# Patient Record
Sex: MALE | Race: WHITE | NOT HISPANIC OR LATINO | Employment: FULL TIME | URBAN - NONMETROPOLITAN AREA
[De-identification: names, ages, dates, MRNs, and addresses within clinical notes are randomized per-mention and may not be internally consistent; named-entity substitution may affect disease eponyms.]

---

## 2018-06-20 ENCOUNTER — OFFICE VISIT (OUTPATIENT)
Dept: CHIROPRACTIC MEDICINE | Facility: OTHER | Age: 38
End: 2018-06-20
Attending: CHIROPRACTOR
Payer: COMMERCIAL

## 2018-06-20 DIAGNOSIS — M99.02 SEGMENTAL AND SOMATIC DYSFUNCTION OF THORACIC REGION: ICD-10-CM

## 2018-06-20 DIAGNOSIS — M99.03 SEGMENTAL AND SOMATIC DYSFUNCTION OF LUMBAR REGION: Primary | ICD-10-CM

## 2018-06-20 DIAGNOSIS — M99.01 SEGMENTAL AND SOMATIC DYSFUNCTION OF CERVICAL REGION: ICD-10-CM

## 2018-06-20 DIAGNOSIS — M54.50 ACUTE BILATERAL LOW BACK PAIN WITHOUT SCIATICA: ICD-10-CM

## 2018-06-20 PROCEDURE — 98941 CHIROPRACT MANJ 3-4 REGIONS: CPT | Mod: AT | Performed by: CHIROPRACTOR

## 2018-06-20 PROCEDURE — 99212 OFFICE O/P EST SF 10 MIN: CPT | Mod: 25 | Performed by: CHIROPRACTOR

## 2018-06-20 NOTE — MR AVS SNAPSHOT
After Visit Summary   6/20/2018    Colton Booth    MRN: [id number]           Patient Information     Date Of Birth          1980        Visit Information        Provider Department      6/20/2018 4:20 PM Jeffery Martinez DC  [facility]        Today's Diagnoses     Segmental and somatic dysfunction of lumbar region    -  1    Acute bilateral low back pain without sciatica        Segmental and somatic dysfunction of thoracic region        Segmental and somatic dysfunction of cervical region           Follow-ups after your visit        Who to contact     If you have questions or need follow up information about today's clinic visit or your schedule please contact  [facility] directly at [phone].  Normal or non-critical lab and imaging results will be communicated to you by MyChart, letter or phone within 4 business days after the clinic has received the results. If you do not hear from us within 7 days, please contact the clinic through MyChart or phone. If you have a critical or abnormal lab result, we will notify you by phone as soon as possible.  Submit refill requests through [employer] or call your pharmacy and they will forward the refill request to us. Please allow 3 business days for your refill to be completed.          Additional Information About Your Visit        Care EveryWhere ID     This is your Care EveryWhere ID. This could be used by other organizations to access your [facility] medical records  [id number]         Blood Pressure from Last 3 Encounters:   02/26/16 136/77   07/28/15 127/92    Weight from Last 3 Encounters:   02/26/16 190 lb (86.2 kg)              We Performed the Following     CHIROPRAC MANIP,SPINAL,3-4 REGIONS        Primary Care Provider    None Specified       No primary provider on file.        Equal Access to Services     [facility] SEBASTIEN : Courtney Moses, do manning, mae welch  shirley humphreys ah. So [facility] [phone].    ATENCIÓN: Si habla cheyenne, tiene a rehman disposición servicios gratuitos de asistencia lingüística. Rohit al [phone].    We comply with applicable federal civil rights laws and [place] laws. We do not discriminate on the basis of race, color, national origin, age, disability, sex, sexual orientation, or gender identity.            Thank you!     Thank you for choosing  [facility]  for your care. Our goal is always to provide you with excellent care. Hearing back from our patients is one way we can continue to improve our services. Please take a few minutes to complete the written survey that you may receive in the mail after your visit with us. Thank you!             Your Updated Medication List - Protect others around you: Learn how to safely use, store and throw away your medicines at www.disposemymeds.org.          This list is accurate as of 6/20/18 11:59 PM.  Always use your most recent med list.                   Brand Name Dispense Instructions for use Diagnosis    oseltamivir 75 MG capsule    TAMIFLU    10 capsule    Take 1 capsule (75 mg) by mouth 2 times daily    Influenza A

## 2018-06-21 NOTE — PROGRESS NOTES
Subjective Finding:    Chief compalint: Patient presents with:  Neck Pain  Back Pain: sharp low back pain  , Pain Scale: 5/10, Intensity: dull, Duration: 2 weeks, Radiating: no.    Date of injury:     Activities that the pain restricts:   Home/household/hobbies/social activities: no.  Work duties: no.  Sleep: no.  Makes symptoms better: rest.  Makes symptoms worse: activity.  Have you seen anyone else for the symptoms? No.  Work related: no.  Automobile related injury: no.    Objective and Assessment:    Posture Analysis:   High shoulder: .  Head tilt: .  High iliac crest: .  Head carriage: neutral.  Thoracic Kyphosis: neutral.  Lumbar Lordosis: forward.    Lumbar Range of Motion: flexion decreased and extension decreased.  Cervical Range of Motion: .  Thoracic Range of Motion: extension decreased.  Extremity Range of Motion: .    Palpation:   Quad lumb: bilateral, referred pain: no    Segmental dysfunction pre-treatment and treatment area: T3, T5, L4 and L5.  C3    Assessment post-treatment:  Cervical: .  Thoracic: ROM increased.  Lumbar: ROM increased.    Comments: .      Complicating Factors: .    Plan / Procedure:    Treatment plan: PRN.  Instructed patient: ice 20 minutes every other hour as needed.  Short term goals: reduce pain and increase ROM.  Long term goals: restore normal function.  Prognosis: excellent.

## 2018-06-22 ENCOUNTER — OFFICE VISIT (OUTPATIENT)
Dept: CHIROPRACTIC MEDICINE | Facility: OTHER | Age: 38
End: 2018-06-22
Attending: CHIROPRACTOR
Payer: COMMERCIAL

## 2018-06-22 DIAGNOSIS — M99.02 SEGMENTAL AND SOMATIC DYSFUNCTION OF THORACIC REGION: ICD-10-CM

## 2018-06-22 DIAGNOSIS — M99.03 SEGMENTAL AND SOMATIC DYSFUNCTION OF LUMBAR REGION: Primary | ICD-10-CM

## 2018-06-22 DIAGNOSIS — M99.01 SEGMENTAL AND SOMATIC DYSFUNCTION OF CERVICAL REGION: ICD-10-CM

## 2018-06-22 DIAGNOSIS — M54.50 ACUTE BILATERAL LOW BACK PAIN WITHOUT SCIATICA: ICD-10-CM

## 2018-06-22 PROCEDURE — 98941 CHIROPRACT MANJ 3-4 REGIONS: CPT | Mod: AT | Performed by: CHIROPRACTOR

## 2018-06-22 NOTE — MR AVS SNAPSHOT
After Visit Summary   6/22/2018    Colton Booth    MRN: [id number]           Patient Information     Date Of Birth          1980        Visit Information        Provider Department      6/22/2018 12:50 PM Jeffery Martinez DC  [facility]        Today's Diagnoses     Segmental and somatic dysfunction of lumbar region    -  1    Acute bilateral low back pain without sciatica        Segmental and somatic dysfunction of thoracic region        Segmental and somatic dysfunction of cervical region           Follow-ups after your visit        Who to contact     If you have questions or need follow up information about today's clinic visit or your schedule please contact  [facility] directly at [phone].  Normal or non-critical lab and imaging results will be communicated to you by MyChart, letter or phone within 4 business days after the clinic has received the results. If you do not hear from us within 7 days, please contact the clinic through MyChart or phone. If you have a critical or abnormal lab result, we will notify you by phone as soon as possible.  Submit refill requests through [employer] or call your pharmacy and they will forward the refill request to us. Please allow 3 business days for your refill to be completed.          Additional Information About Your Visit        Care EveryWhere ID     This is your Care EveryWhere ID. This could be used by other organizations to access your [facility] medical records  [id number]         Blood Pressure from Last 3 Encounters:   02/26/16 136/77   07/28/15 127/92    Weight from Last 3 Encounters:   02/26/16 190 lb (86.2 kg)              We Performed the Following     CHIROPRAC MANIP,SPINAL,3-4 REGIONS        Primary Care Provider Fax #    Physician No Ref-Primary [phone]       No address on file        Equal Access to Services     JENNY CROWE : Courtney Moses, do manning, bijan casitllo,  mae nunezangel moura'aan ah. So [facility] [phone].    ATENCIÓN: Si habla cheyenne, tiene a rehman disposición servicios gratuitos de asistencia lingüística. Rohit al [phone].    We comply with applicable federal civil rights laws and [place] laws. We do not discriminate on the basis of race, color, national origin, age, disability, sex, sexual orientation, or gender identity.            Thank you!     Thank you for choosing  [facility]  for your care. Our goal is always to provide you with excellent care. Hearing back from our patients is one way we can continue to improve our services. Please take a few minutes to complete the written survey that you may receive in the mail after your visit with us. Thank you!             Your Updated Medication List - Protect others around you: Learn how to safely use, store and throw away your medicines at www.disposemymeds.org.          This list is accurate as of 6/22/18 11:59 PM.  Always use your most recent med list.                   Brand Name Dispense Instructions for use Diagnosis    oseltamivir 75 MG capsule    TAMIFLU    10 capsule    Take 1 capsule (75 mg) by mouth 2 times daily    Influenza A

## 2018-06-25 NOTE — PROGRESS NOTES
Subjective Finding:    Chief compalint: Patient presents with:  Neck Pain  Back Pain  , Pain Scale: 5/10, Intensity: dull, Duration: 2 weeks, Radiating: no.    Date of injury:     Activities that the pain restricts:   Home/household/hobbies/social activities: no.  Work duties: no.  Sleep: no.  Makes symptoms better: rest.  Makes symptoms worse: activity.  Have you seen anyone else for the symptoms? No.  Work related: no.  Automobile related injury: no.    Objective and Assessment:    Posture Analysis:   High shoulder: .  Head tilt: .  High iliac crest: .  Head carriage: neutral.  Thoracic Kyphosis: neutral.  Lumbar Lordosis: forward.    Lumbar Range of Motion: flexion decreased and extension decreased.  Cervical Range of Motion: .  Thoracic Range of Motion: extension decreased.  Extremity Range of Motion: .    Palpation:   Quad lumb: bilateral, referred pain: no    Segmental dysfunction pre-treatment and treatment area: T3, T5, L4 and L5.  C3    Assessment post-treatment:  Cervical: .  Thoracic: ROM increased.  Lumbar: ROM increased.    Comments: .      Complicating Factors: .    Plan / Procedure:    Treatment plan: PRN.  Instructed patient: ice 20 minutes every other hour as needed.  Short term goals: reduce pain and increase ROM.  Long term goals: restore normal function.  Prognosis: excellent.

## 2018-09-05 ENCOUNTER — HOSPITAL ENCOUNTER (EMERGENCY)
Facility: HOSPITAL | Age: 38
Discharge: HOME | End: 2018-09-05
Attending: FAMILY MEDICINE | Admitting: FAMILY MEDICINE
Payer: COMMERCIAL

## 2018-09-05 VITALS
SYSTOLIC BLOOD PRESSURE: 149 MMHG | TEMPERATURE: 97.3 F | DIASTOLIC BLOOD PRESSURE: 94 MMHG | RESPIRATION RATE: 16 BRPM | OXYGEN SATURATION: 98 %

## 2018-09-05 DIAGNOSIS — T78.40XA ALLERGIC REACTION, INITIAL ENCOUNTER: ICD-10-CM

## 2018-09-05 DIAGNOSIS — T63.441A BEE STING REACTION, ACCIDENTAL OR UNINTENTIONAL, INITIAL ENCOUNTER: ICD-10-CM

## 2018-09-05 PROCEDURE — 25000132 ZZH RX MED GY IP 250 OP 250 PS 637: Performed by: FAMILY MEDICINE

## 2018-09-05 PROCEDURE — 99283 EMERGENCY DEPT VISIT LOW MDM: CPT | Mod: Z6 | Performed by: FAMILY MEDICINE

## 2018-09-05 PROCEDURE — 99283 EMERGENCY DEPT VISIT LOW MDM: CPT

## 2018-09-05 RX ORDER — LORAZEPAM 1 MG/1
1 TABLET ORAL ONCE
Status: DISCONTINUED | OUTPATIENT
Start: 2018-09-05 | End: 2018-09-05

## 2018-09-05 RX ORDER — DIPHENHYDRAMINE HCL 25 MG
50 CAPSULE ORAL ONCE
Status: COMPLETED | OUTPATIENT
Start: 2018-09-05 | End: 2018-09-05

## 2018-09-05 RX ORDER — EPINEPHRINE 0.3 MG/.3ML
0.3 INJECTION SUBCUTANEOUS
Qty: 0.6 ML | Refills: 1 | Status: SHIPPED | OUTPATIENT
Start: 2018-09-05 | End: 2023-04-13

## 2018-09-05 RX ADMIN — RANITIDINE 150 MG: 150 TABLET ORAL at 21:07

## 2018-09-05 RX ADMIN — DIPHENHYDRAMINE HYDROCHLORIDE 50 MG: 25 CAPSULE ORAL at 21:07

## 2018-09-05 ASSESSMENT — ENCOUNTER SYMPTOMS
TREMORS: 0
PSYCHIATRIC NEGATIVE: 1
SORE THROAT: 0
PALPITATIONS: 0
HEMATOLOGIC/LYMPHATIC NEGATIVE: 1
GASTROINTESTINAL NEGATIVE: 1
WEAKNESS: 0
ENDOCRINE NEGATIVE: 1
HEADACHES: 0
CONSTITUTIONAL NEGATIVE: 1
SEIZURES: 0
LIGHT-HEADEDNESS: 1
SHORTNESS OF BREATH: 0
DIZZINESS: 1
WHEEZING: 0
TROUBLE SWALLOWING: 0
NUMBNESS: 0
STRIDOR: 0

## 2018-09-05 NOTE — ED AVS SNAPSHOT
[facility]    [address]    [place] [address]    Phone:  [phone]                                       Colton Booth   MRN: [id number]    Department:  [facility]   Date of Visit:  9/5/2018           Patient Information     Date Of Birth          1980        Your diagnoses for this visit were:     Allergic reaction, initial encounter     Bee sting reaction, accidental or unintentional, initial encounter        You were seen by Felicia Dawn MD.      Follow-up Information     Follow up with No Ref-Primary, Physician.    Why:  As needed        Discharge Instructions             Insect, Spider, and Scorpion Bites and Stings  Most insect bites are harmless and cause only minor swelling or itching. But if you re allergic to insects such as wasps or bees, a sting can cause a life-threatening allergic reaction. Some ticks can carry and transmit serious diseases. The venom (poison) from scorpions and certain spiders can also be deadly, although this is rare. Knowing when to seek emergency care could save your life.     The black  (top) and brown recluse (bottom) are two poisonous spiders found in the [place].   When to go to the emergency room (ER)    Scorpion sting    Bite from a black, red, or brown  spider or brown recluse spider    Severe pain or swelling at the site of bite    A tick that is embedded in your skin and can not be easily removed at home    Signs of an allergic reaction such as:  ? Hives  ? Swelling of your eyes, lips, or the inside of your throat  ? Trouble breathing  ? Dizziness or confusion  What to expect in the ER    If you re having trouble breathing, you ll be given oxygen through a mask. In case of severe breathing difficulty, you may have a tube inserted in your throat and be placed on a ventilator (breathing machine).    If you are having a severe allergic reaction from a sting (called anaphylaxis), you may be  "given a shot of epinephrine. If it is known that you are allergic to bee or wasp stings, your doctor may give you a prescription for an \"epi-pen\" that you can keep with you at all times in case of a sting.    You may receive antivenin (a substance that reverses the effects of poison) for some spider bites and scorpion stings. Because antivenin can sometimes cause other problems, your doctor will weigh the risks and benefits of this treatment.    Steroids such as prednisone are often used to treat allergic reactions. In many cases, your doctor will also prescribe an antihistamine to help relieve itching.  Easing symptoms of an insect bite or sting    Try to remove a stinger you can see. Use your fingernail, a knife edge, or credit card to scrape against the skin. Do not squeeze or pull.    Apply ice or a cold compress to reduce pain and swelling (keep a thin cloth between the cold source and the skin).   Date Last Reviewed: 12/1/2016 2000-2017 [employer]. [address] [place] [address]. All rights reserved. This information is not intended as a substitute for professional medical care. Always follow your healthcare professional's instructions.        0294-6594 [employer]. [address] [place] [address]. All rights reserved. This information is not intended as a substitute for professional medical care. Always follow your healthcare professional's instructions.             Review of your medicines      START taking        Dose / Directions Last dose taken    EPINEPHrine 0.3 MG/0.3ML injection 2-pack   Commonly known as:  EPIPEN/ADRENACLICK/or ANY BX GENERIC EQUIV   Dose:  0.3 mg   Quantity:  0.6 mL        Inject 0.3 mLs (0.3 mg) into the muscle once as needed for anaphylaxis   Refills:  1                Prescriptions were sent or printed at these locations (1 Prescription)                   [facility] [place] - [address]   [address], [place] [address]    Telephone:  [phone]   Fax:  [phone]   Hours:                  E-Prescribed (1 of 1)         EPINEPHrine (EPIPEN/ADRENACLICK/OR ANY BX GENERIC EQUIV) 0.3 MG/0.3ML injection 2-pack                Orders Needing Specimen Collection     None      Pending Results     No orders found from 9/3/2018 to 9/6/2018.            Pending Culture Results     No orders found from 9/3/2018 to 9/6/2018.            Thank you for choosing [facility]       Thank you for choosing [facility] for your care. Our goal is always to provide you with excellent care. Hearing back from our patients is one way we can continue to improve our services. Please take a few minutes to complete the written survey that you may receive in the mail after you visit with us. Thank you!        Care EveryWhere ID     This is your Care EveryWhere ID. This could be used by other organizations to access your [facility] medical records  [id number]        Equal Access to Services     JENNY CROWE : Courtney Moses, do manning, bijan castillo, mae humphreys . So [facility] [phone].    ATENCIÓN: Si habla español, tiene a rehman disposición servicios gratuitos de asistencia lingüística. Llame al [phone].    We comply with applicable federal civil rights laws and [place] laws. We do not discriminate on the basis of race, color, national origin, age, disability, sex, sexual orientation, or gender identity.            After Visit Summary       This is your record. Keep this with you and show to your community pharmacist(s) and doctor(s) at your next visit.

## 2018-09-05 NOTE — ED AVS SNAPSHOT
[facility]    [address] [place] [address]    Phone:  [phone]                                       Colton Booth   MRN: [id number]    Department:  [facility]   Date of Visit:  9/5/2018           After Visit Summary Signature Page     I have received my discharge instructions, and my questions have been answered. I have discussed any challenges I see with this plan with the nurse or doctor.    ..........................................................................................................................................  Patient/Patient Representative Signature      ..........................................................................................................................................  Patient Representative Print Name and Relationship to Patient    ..................................................               ................................................  Date                                            Time    ..........................................................................................................................................  Reviewed by Signature/Title    ...................................................              ..............................................  Date                                                            Time          22EPIC Rev 08/18

## 2018-09-06 NOTE — ED PROVIDER NOTES
"  History     Chief Complaint   Patient presents with     Insect Bite     c/o bee sting approx 2 hrs, notes throat \"feels weird\", but not swollen. no resp distress. c/o dizziness. took benadryl at approx 1800     HPI  Colton Booth is a 38 year old male who presents after being stung by a bee 3 hours ago .   Took benadryl unsure how much 10-15 minutes following the sting . Stung right ear. Swelling seem s to have improved somewhat . Was getting dizzy and nauseated shortly after the sting. Dizziness prompted him to come to the ER .  Swallowing felt weird initially but that has improved now .   NO other previous severe allergic reactions . NO significant wheezing in chest . Denies any rash. No sob. NO history of asthma or lung problems. He does not take any medications on a regular basis     Problem List:    There are no active problems to display for this patient.       Past Medical History:    History reviewed. No pertinent past medical history.    Past Surgical History:    Past Surgical History:   Procedure Laterality Date     CIRCUMCISION       EYE SURGERY      lazy eye surgery, bilateral     FINGER SURGERY      cyst ring finger, LT       Family History:    Family History   Problem Relation Age of Onset     Diabetes Father      Diabetes Paternal Grandfather      High cholesterol Father        Social History:  Marital Status:   [2]  Social History   Substance Use Topics     Smoking status: Former Smoker     Packs/day: 0.30     Years: 10.00     Types: Cigarettes     Smokeless tobacco: Not on file      Comment: tried to quit yes, yr quit 2009, no passive exposure     Alcohol use Yes      Comment: every other weekend        Medications:      No current outpatient prescriptions on file.      Review of Systems   Constitutional: Negative.    HENT: Negative.  Negative for sore throat and trouble swallowing.         Throat just \" felt funny\"    Respiratory: Negative for shortness of breath, wheezing and stridor. "    Cardiovascular: Negative for chest pain and palpitations.   Gastrointestinal: Negative.    Endocrine: Negative.    Genitourinary: Negative.    Skin: Negative.    Neurological: Positive for dizziness and light-headedness. Negative for tremors, seizures, syncope, weakness, numbness and headaches.   Hematological: Negative.    Psychiatric/Behavioral: Negative.        Physical Exam   BP: 149/94  Heart Rate: 79  Temp: 97.3  F (36.3  C)  Resp: 16  SpO2: 98 %      Physical Exam   Constitutional: He is oriented to person, place, and time. He appears well-developed and well-nourished. No distress.   HENT:   Head: Normocephalic and atraumatic.   Right Ear: External ear normal.   Left Ear: External ear normal.   Nose: Nose normal.   Mouth/Throat: Oropharynx is clear and moist. No oropharyngeal exudate.   Eyes: Conjunctivae and EOM are normal. Pupils are equal, round, and reactive to light. Right eye exhibits no discharge. Left eye exhibits no discharge.   Neck: Normal range of motion. Neck supple. No JVD present. No tracheal deviation present. No thyromegaly present.   Cardiovascular: Normal rate, regular rhythm, normal heart sounds and intact distal pulses.  Exam reveals no gallop and no friction rub.    No murmur heard.  Pulmonary/Chest: Breath sounds normal. No stridor. He is in respiratory distress. He has no wheezes. He has no rales. He exhibits no tenderness.   Abdominal: Soft. Bowel sounds are normal. He exhibits no distension and no mass. There is no tenderness. There is no rebound and no guarding.   Musculoskeletal: Normal range of motion. He exhibits no edema, tenderness or deformity.   Lymphadenopathy:     He has no cervical adenopathy.   Neurological: He is alert and oriented to person, place, and time.   Skin: Skin is warm and dry. He is not diaphoretic.   Psychiatric: He has a normal mood and affect.   Nursing note and vitals reviewed.      ED Course     ED Course     Procedures          Patient presents to ER  with concerns of reaction to Bee sting that occurred 3 hours previously . Patient triaged to exam room . Vital signs reviewed. History and exam completed. Patient without signs or symptoms to suggest anaphylaxis. Patient did apply ice and did take benadryl of uncertain dose several hours previously at time of sting. Patient stated felt better at time of initial exam and wanted discharge as soon as possible. Patient given repeat dose of benadaryl and zantac. Discussed with patient that reactions often become more severe the more often they occur. He may continue to use benadadryl , zantac as needed next 24 - 48 hours as needed for itch ,localized swelling, discomfort. If he should develop symptoms of more severe allergic reaction he should return to ER for reevaluation . I have recommended he  an epi pen and carry that with him as if he gets stung again his next reaction is likely to be more severe  .        No results found for this or any previous visit (from the past 24 hour(s)).    Medications   0.9% sodium chloride BOLUS (not administered)       Assessments & Plan (with Medical Decision Making)     I have reviewed the nursing notes.    I have reviewed the findings, diagnosis, plan and need for follow up with the patient.      New Prescriptions    No medications on file       Final diagnoses:   Allergic reaction, initial encounter   Bee sting reaction, accidental or unintentional, initial encounter       9/5/2018   [facility]     Felicia Dawn MD  09/05/18 2708

## 2018-09-06 NOTE — DISCHARGE INSTRUCTIONS
"      Insect, Spider, and Scorpion Bites and Stings  Most insect bites are harmless and cause only minor swelling or itching. But if you re allergic to insects such as wasps or bees, a sting can cause a life-threatening allergic reaction. Some ticks can carry and transmit serious diseases. The venom (poison) from scorpions and certain spiders can also be deadly, although this is rare. Knowing when to seek emergency care could save your life.     The black  (top) and brown recluse (bottom) are two poisonous spiders found in the [place].   When to go to the emergency room (ER)    Scorpion sting    Bite from a black, red, or brown  spider or brown recluse spider    Severe pain or swelling at the site of bite    A tick that is embedded in your skin and can not be easily removed at home    Signs of an allergic reaction such as:  ? Hives  ? Swelling of your eyes, lips, or the inside of your throat  ? Trouble breathing  ? Dizziness or confusion  What to expect in the ER    If you re having trouble breathing, you ll be given oxygen through a mask. In case of severe breathing difficulty, you may have a tube inserted in your throat and be placed on a ventilator (breathing machine).    If you are having a severe allergic reaction from a sting (called anaphylaxis), you may be given a shot of epinephrine. If it is known that you are allergic to bee or wasp stings, your doctor may give you a prescription for an \"epi-pen\" that you can keep with you at all times in case of a sting.    You may receive antivenin (a substance that reverses the effects of poison) for some spider bites and scorpion stings. Because antivenin can sometimes cause other problems, your doctor will weigh the risks and benefits of this treatment.    Steroids such as prednisone are often used to treat allergic reactions. In many cases, your doctor will also prescribe an antihistamine to help relieve itching.  Easing symptoms of an insect bite or " sting    Try to remove a stinger you can see. Use your fingernail, a knife edge, or credit card to scrape against the skin. Do not squeeze or pull.    Apply ice or a cold compress to reduce pain and swelling (keep a thin cloth between the cold source and the skin).   Date Last Reviewed: 12/1/2016 2000-2017 [employer]. [address] [place] [address]. All rights reserved. This information is not intended as a substitute for professional medical care. Always follow your healthcare professional's instructions.        2731-2825 [employer]. [address] [place] [address]. All rights reserved. This information is not intended as a substitute for professional medical care. Always follow your healthcare professional's instructions.

## 2018-09-07 ENCOUNTER — OFFICE VISIT (OUTPATIENT)
Dept: FAMILY MEDICINE | Facility: OTHER | Age: 38
End: 2018-09-07
Attending: NURSE PRACTITIONER
Payer: COMMERCIAL

## 2018-09-07 VITALS
HEART RATE: 70 BPM | BODY MASS INDEX: 26.11 KG/M2 | TEMPERATURE: 96.7 F | OXYGEN SATURATION: 97 % | HEIGHT: 73 IN | SYSTOLIC BLOOD PRESSURE: 108 MMHG | DIASTOLIC BLOOD PRESSURE: 70 MMHG | WEIGHT: 197 LBS | RESPIRATION RATE: 16 BRPM

## 2018-09-07 DIAGNOSIS — T78.40XD ALLERGIC REACTION, SUBSEQUENT ENCOUNTER: ICD-10-CM

## 2018-09-07 DIAGNOSIS — H60.11 CELLULITIS OF RIGHT EAR: Primary | ICD-10-CM

## 2018-09-07 PROCEDURE — 99213 OFFICE O/P EST LOW 20 MIN: CPT | Performed by: NURSE PRACTITIONER

## 2018-09-07 RX ORDER — CEPHALEXIN 500 MG/1
500 CAPSULE ORAL 2 TIMES DAILY
Qty: 20 CAPSULE | Refills: 0 | Status: SHIPPED | OUTPATIENT
Start: 2018-09-07 | End: 2018-09-17

## 2018-09-07 RX ORDER — PREDNISONE 20 MG/1
20 TABLET ORAL DAILY
Qty: 5 TABLET | Refills: 0 | Status: SHIPPED | OUTPATIENT
Start: 2018-09-07 | End: 2023-04-12

## 2018-09-07 ASSESSMENT — ANXIETY QUESTIONNAIRES
4. TROUBLE RELAXING: NOT AT ALL
5. BEING SO RESTLESS THAT IT IS HARD TO SIT STILL: NOT AT ALL
2. NOT BEING ABLE TO STOP OR CONTROL WORRYING: NOT AT ALL
3. WORRYING TOO MUCH ABOUT DIFFERENT THINGS: NOT AT ALL
GAD7 TOTAL SCORE: 0
7. FEELING AFRAID AS IF SOMETHING AWFUL MIGHT HAPPEN: NOT AT ALL
6. BECOMING EASILY ANNOYED OR IRRITABLE: NOT AT ALL
1. FEELING NERVOUS, ANXIOUS, OR ON EDGE: NOT AT ALL
IF YOU CHECKED OFF ANY PROBLEMS ON THIS QUESTIONNAIRE, HOW DIFFICULT HAVE THESE PROBLEMS MADE IT FOR YOU TO DO YOUR WORK, TAKE CARE OF THINGS AT HOME, OR GET ALONG WITH OTHER PEOPLE: NOT DIFFICULT AT ALL

## 2018-09-07 NOTE — MR AVS SNAPSHOT
After Visit Summary   9/7/2018    Colotn Booth    MRN: [id number]           Patient Information     Date Of Birth          1980        Visit Information        Provider Department      9/7/2018 3:30 PM Sharyn Granger NP [facility]        Today's Diagnoses     Cellulitis of right ear    -  1    Allergic reaction, subsequent encounter          Care Instructions      ASSESSMENT/PLAN:     1. Cellulitis of right ear  symptomatic  - cephALEXin (KEFLEX) 500 MG capsule; Take 1 capsule (500 mg) by mouth 2 times daily for 10 days  Dispense: 20 capsule; Refill: 0    2. Allergic reaction, subsequent encounter  symptomatic  - predniSONE (DELTASONE) 20 MG tablet; Take 1 tablet (20 mg) by mouth daily  Dispense: 5 tablet; Refill: 0    FUTURE APPOINTMENTS:       - Follow-up visit as needed; to ED with any worsening     Sharyn Granger NP  [facility]  Cellulitis  Cellulitis is an infection of the deep layers of skin. A break in the skin, such as a cut or scratch, can let bacteria under the skin. If the bacteria get to deep layers of the skin, it can be serious. If not treated, cellulitis can get into the bloodstream and lymph nodes. The infection can then spread throughout the body. This causes serious illness.  Cellulitis causes the affected skin to become red, swollen, warm, and sore. The reddened areas have a visible border. An open sore may leak fluid (pus). You may have a fever, chills, and pain.  Cellulitis is treated with antibiotics taken for 7 to 10 days. An open sore may be cleaned and covered with cool wet gauze. Symptoms should get better 1 to 2 days after treatment is started. Make sure to take all the antibiotics for the full number of days until they are gone. Keep taking the medicine even if your symptoms go away.  Home care  Follow these tips:    Limit the use of the part of your body with cellulitis.     If the infection is on your leg, keep your  leg raised while sitting. This will help to reduce swelling.    Take all of the antibiotic medicine exactly as directed until it is gone. Do not miss any doses, especially during the first 7 days. Don t stop taking the medicine when your symptoms get better.    Keep the affected area clean and dry.    Wash your hands with soap and warm water before and after touching your skin. Anyone else who touches your skin should also wash his or her hands. Don't share towels.  Follow-up care  Follow up with your healthcare provider, or as advised. If your infection does not go away on the first antibiotic, your healthcare provider will prescribe a different one.  When to seek medical advice  Call your healthcare provider right away if any of these occur:    Red areas that spread    Swelling or pain that gets worse    Fluid leaking from the skin (pus)    Fever higher of 100.4  F (38.0  C) or higher after 2 days on antibiotics  Date Last Reviewed: 9/1/2016 2000-2017 [employer]. [address] [place] [address]. All rights reserved. This information is not intended as a substitute for professional medical care. Always follow your healthcare professional's instructions.                Follow-ups after your visit        Follow-up notes from your care team     Return if symptoms worsen or fail to improve.      Who to contact     If you have questions or need follow up information about today's clinic visit or your schedule please contact [facility] directly at [phone].  Normal or non-critical lab and imaging results will be communicated to you by MyChart, letter or phone within 4 business days after the clinic has received the results. If you do not hear from us within 7 days, please contact the clinic through MyChart or phone. If you have a critical or abnormal lab result, we will notify you by phone as soon as possible.  Submit refill requests through [facility] or call your pharmacy and  "they will forward the refill request to us. Please allow 3 business days for your refill to be completed.          Additional Information About Your Visit        Care EveryWhere ID     This is your Care EveryWhere ID. This could be used by other organizations to access your [place] medical records  [id number]        Your Vitals Were     Pulse Temperature Respirations Height Pulse Oximetry BMI (Body Mass Index)    70 96.7  F (35.9  C) (Tympanic) 16 6' 1\" (1.854 m) 97% 25.99 kg/m2       Blood Pressure from Last 3 Encounters:   09/07/18 108/70   09/05/18 149/94   02/26/16 136/77    Weight from Last 3 Encounters:   09/07/18 197 lb (89.4 kg)   02/26/16 190 lb (86.2 kg)              Today, you had the following     No orders found for display         Today's Medication Changes          These changes are accurate as of 9/7/18  3:59 PM.  If you have any questions, ask your nurse or doctor.               Start taking these medicines.        Dose/Directions    cephALEXin 500 MG capsule   Commonly known as:  KEFLEX   Used for:  Cellulitis of right ear        Dose:  500 mg   Take 1 capsule (500 mg) by mouth 2 times daily for 10 days   Quantity:  20 capsule   Refills:  0       predniSONE 20 MG tablet   Commonly known as:  DELTASONE   Used for:  Allergic reaction, subsequent encounter        Dose:  20 mg   Take 1 tablet (20 mg) by mouth daily   Quantity:  5 tablet   Refills:  0            Where to get your medicines      These medications were sent to [facility] [id number] [place] [address] [address] [facility] [address]  [address] [facility] [address]     Phone:  [phone]     cephALEXin 500 MG capsule    predniSONE 20 MG tablet                Primary Care Provider Fax #    Physician No Ref-Primary [phone]       No address on file        Equal Access to Services     JENNY CROWE AH: Courtney Moses, do manning, bijan castillo, mae salmon " shirley humphreys ah. [facility] [phone].    ATENCIÓN: Si habla cheyenne, tiene a rehman disposición servicios gratuitos de asistencia lingüística. Rohit bloom [phone].    We comply with applicable federal civil rights laws and [place] laws. We do not discriminate on the basis of race, color, national origin, age, disability, sex, sexual orientation, or gender identity.            Thank you!     Thank you for choosing [facility]  for your care. Our goal is always to provide you with excellent care. Hearing back from our patients is one way we can continue to improve our services. Please take a few minutes to complete the written survey that you may receive in the mail after your visit with us. Thank you!             Your Updated Medication List - Protect others around you: Learn how to safely use, store and throw away your medicines at www.disposemymeds.org.          This list is accurate as of 9/7/18  3:59 PM.  Always use your most recent med list.                   Brand Name Dispense Instructions for use Diagnosis    cephALEXin 500 MG capsule    KEFLEX    20 capsule    Take 1 capsule (500 mg) by mouth 2 times daily for 10 days    Cellulitis of right ear       EPINEPHrine 0.3 MG/0.3ML injection 2-pack    EPIPEN/ADRENACLICK/or ANY BX GENERIC EQUIV    0.6 mL    Inject 0.3 mLs (0.3 mg) into the muscle once as needed for anaphylaxis        predniSONE 20 MG tablet    DELTASONE    5 tablet    Take 1 tablet (20 mg) by mouth daily    Allergic reaction, subsequent encounter

## 2018-09-07 NOTE — PATIENT INSTRUCTIONS
ASSESSMENT/PLAN:     1. Cellulitis of right ear  symptomatic  - cephALEXin (KEFLEX) 500 MG capsule; Take 1 capsule (500 mg) by mouth 2 times daily for 10 days  Dispense: 20 capsule; Refill: 0    2. Allergic reaction, subsequent encounter  symptomatic  - predniSONE (DELTASONE) 20 MG tablet; Take 1 tablet (20 mg) by mouth daily  Dispense: 5 tablet; Refill: 0    FUTURE APPOINTMENTS:       - Follow-up visit as needed; to ED with any worsening     Sharyn Granger NP  [facility]  Cellulitis  Cellulitis is an infection of the deep layers of skin. A break in the skin, such as a cut or scratch, can let bacteria under the skin. If the bacteria get to deep layers of the skin, it can be serious. If not treated, cellulitis can get into the bloodstream and lymph nodes. The infection can then spread throughout the body. This causes serious illness.  Cellulitis causes the affected skin to become red, swollen, warm, and sore. The reddened areas have a visible border. An open sore may leak fluid (pus). You may have a fever, chills, and pain.  Cellulitis is treated with antibiotics taken for 7 to 10 days. An open sore may be cleaned and covered with cool wet gauze. Symptoms should get better 1 to 2 days after treatment is started. Make sure to take all the antibiotics for the full number of days until they are gone. Keep taking the medicine even if your symptoms go away.  Home care  Follow these tips:    Limit the use of the part of your body with cellulitis.     If the infection is on your leg, keep your leg raised while sitting. This will help to reduce swelling.    Take all of the antibiotic medicine exactly as directed until it is gone. Do not miss any doses, especially during the first 7 days. Don t stop taking the medicine when your symptoms get better.    Keep the affected area clean and dry.    Wash your hands with soap and warm water before and after touching your skin. Anyone else who touches your  skin should also wash his or her hands. Don't share towels.  Follow-up care  Follow up with your healthcare provider, or as advised. If your infection does not go away on the first antibiotic, your healthcare provider will prescribe a different one.  When to seek medical advice  Call your healthcare provider right away if any of these occur:    Red areas that spread    Swelling or pain that gets worse    Fluid leaking from the skin (pus)    Fever higher of 100.4  F (38.0  C) or higher after 2 days on antibiotics  Date Last Reviewed: 9/1/2016 2000-2017 [employer]. [address] [place] [address]. All rights reserved. This information is not intended as a substitute for professional medical care. Always follow your healthcare professional's instructions.

## 2018-09-07 NOTE — NURSING NOTE
"Chief Complaint   Patient presents with     Hospital F/U     allergic raction to bee sting       Initial /70 (BP Location: Left arm, Patient Position: Chair, Cuff Size: Adult Large)  Pulse 70  Temp 96.7  F (35.9  C) (Tympanic)  Resp 16  Ht 6' 1\" (1.854 m)  Wt 197 lb (89.4 kg)  SpO2 97%  BMI 25.99 kg/m2 Estimated body mass index is 25.99 kg/(m^2) as calculated from the following:    Height as of this encounter: 6' 1\" (1.854 m).    Weight as of this encounter: 197 lb (89.4 kg).  Medication Reconciliation: complete    Pamela M. Lechevalier, LPN    "

## 2018-09-07 NOTE — PROGRESS NOTES
SUBJECTIVE:   Colton Booth is a 38 year old male who presents to clinic today for the following health issues:      ED/UC Followup:    Facility:  [facility]  Date of visit: 9/5/18  Reason for visit: Allergic reaction to bee sting and dizziness   Current Status: Numbness on side of face and ear still Right side        HPI  Colton Booth is a 38 year old male who presents after being stung by a bee 3 hours ago .   Took benadryl unsure how much 10-15 minutes following the sting . Stung right ear. Swelling seem s to have improved somewhat . Was getting dizzy and nauseated shortly after the sting. Dizziness prompted him to come to the ER .  Swallowing felt weird initially but that has improved now .   NO other previous severe allergic reactions . NO significant wheezing in chest . Denies any rash. No sob. NO history of asthma or lung problems. He does not take any medications on a regular basis     9/7/2018:  Right ear remains swollen, red and warm to touch.  No change in hearing.  He took benadryl and zantac.  Symptoms have been worsening over the past day.     Problem list and histories reviewed & adjusted, as indicated.  Additional history: as documented    There is no problem list on file for this patient.    Past Surgical History:   Procedure Laterality Date     CIRCUMCISION       EYE SURGERY      lazy eye surgery, bilateral     FINGER SURGERY      cyst ring finger, LT       Social History   Substance Use Topics     Smoking status: Former Smoker     Packs/day: 0.30     Years: 10.00     Types: Cigarettes     Smokeless tobacco: Not on file      Comment: tried to quit yes, yr quit 2009, no passive exposure     Alcohol use Yes      Comment: every other weekend     Family History   Problem Relation Age of Onset     Diabetes Father      Diabetes Paternal Grandfather      High cholesterol Father          Current Outpatient Prescriptions   Medication Sig Dispense Refill     EPINEPHrine (EPIPEN/ADRENACLICK/OR ANY BX  "GENERIC EQUIV) 0.3 MG/0.3ML injection 2-pack Inject 0.3 mLs (0.3 mg) into the muscle once as needed for anaphylaxis 0.6 mL 1     Allergies   Allergen Reactions     Bee Venom Swelling     Honey Bee hard time swallowing      No lab results found.   BP Readings from Last 3 Encounters:   09/07/18 108/70   09/05/18 149/94   02/26/16 136/77    Wt Readings from Last 3 Encounters:   09/07/18 197 lb (89.4 kg)   02/26/16 190 lb (86.2 kg)                    Reviewed and updated as needed this visit by clinical staff  Allergies       Reviewed and updated as needed this visit by Provider         ROS:  Constitutional, HEENT, cardiovascular, pulmonary, gi and gu systems are negative, except as otherwise noted.    OBJECTIVE:     /70 (BP Location: Left arm, Patient Position: Chair, Cuff Size: Adult Large)  Pulse 70  Temp 96.7  F (35.9  C) (Tympanic)  Resp 16  Ht 6' 1\" (1.854 m)  Wt 197 lb (89.4 kg)  SpO2 97%  BMI 25.99 kg/m2  Body mass index is 25.99 kg/(m^2).  GENERAL: healthy, alert and no distress  HENT: normal cephalic/atraumatic, right ear: TM normal, ear is erythematous, swollen and warm, mild tenderness with palpation, left ear: normal: no effusions, no erythema, normal landmarks, nose and mouth without ulcers or lesions, oropharynx clear and oral mucous membranes moist  NECK: no adenopathy, no asymmetry, masses, or scars and thyroid normal to palpation  RESP: lungs clear to auscultation - no rales, rhonchi or wheezes  CV: regular rate and rhythm, normal S1 S2, no S3 or S4, no murmur, click or rub, no peripheral edema and peripheral pulses strong  MS: no gross musculoskeletal defects noted, no edema  PSYCH: mentation appears normal, affect normal/bright        ASSESSMENT/PLAN:         1. Cellulitis of right ear  symptomatic  - cephALEXin (KEFLEX) 500 MG capsule; Take 1 capsule (500 mg) by mouth 2 times daily for 10 days  Dispense: 20 capsule; Refill: 0    2. Allergic reaction, subsequent " encounter  symptomatic  - predniSONE (DELTASONE) 20 MG tablet; Take 1 tablet (20 mg) by mouth daily  Dispense: 5 tablet; Refill: 0    FUTURE APPOINTMENTS:       - Follow-up visit as needed; to ED with any worsening     Sharyn Granger, NP  [facility]

## 2018-09-08 ASSESSMENT — ANXIETY QUESTIONNAIRES: GAD7 TOTAL SCORE: 0

## 2018-09-08 ASSESSMENT — PATIENT HEALTH QUESTIONNAIRE - PHQ9: SUM OF ALL RESPONSES TO PHQ QUESTIONS 1-9: 0

## 2020-09-22 ENCOUNTER — OFFICE VISIT (OUTPATIENT)
Dept: FAMILY MEDICINE | Facility: OTHER | Age: 40
End: 2020-09-22
Attending: FAMILY MEDICINE
Payer: COMMERCIAL

## 2020-09-22 ENCOUNTER — NURSE TRIAGE (OUTPATIENT)
Dept: NURSING | Facility: CLINIC | Age: 40
End: 2020-09-22

## 2020-09-22 DIAGNOSIS — R50.9 FEVER AND CHILLS: Primary | ICD-10-CM

## 2020-09-22 PROCEDURE — U0003 INFECTIOUS AGENT DETECTION BY NUCLEIC ACID (DNA OR RNA); SEVERE ACUTE RESPIRATORY SYNDROME CORONAVIRUS 2 (SARS-COV-2) (CORONAVIRUS DISEASE [COVID-19]), AMPLIFIED PROBE TECHNIQUE, MAKING USE OF HIGH THROUGHPUT TECHNOLOGIES AS DESCRIBED BY CMS-2020-01-R: HCPCS | Performed by: FAMILY MEDICINE

## 2020-09-22 NOTE — TELEPHONE ENCOUNTER
Caller is complaining of sore throat, mild chest pressure, diarrhea and weakness. Caller states his temperature is 100.9 orally and it started a few days ago. No temperature currently.Caller is requesting a covid-19 test for work. Triager referred patient to [email or web address]. Triager guidelines recommend to home care. Caller verbalized and understands directives.  COVID 19 Nurse Triage Plan/Patient Instructions    Please be aware that novel coronavirus (COVID-19) may be circulating in the community. If you develop symptoms such as fever, cough, or SOB or if you have concerns about the presence of another infection including coronavirus (COVID-19), please contact your health care provider or visit [email or web address].     Disposition/Instructions    Virtual Visit with provider recommended. Reference Visit Selection Guide.    Thank you for taking steps to prevent the spread of this virus.  o Limit your contact with others.  o Wear a simple mask to cover your cough.  o Wash your hands well and often.    Resources    [facility]: About COVID-19: [email or web address]    CDC: What to Do If You're Sick: www.cdc.gov/coronavirus/2019-ncov/about/steps-when-sick.html    CDC: Ending Home Isolation: www.cdc.gov/coronavirus/2019-ncov/hcp/disposition-in-home-patients.html     CDC: Caring for Someone: www.cdc.gov/coronavirus/2019-ncov/if-you-are-sick/care-for-someone.html     [facility]: Interim Guidance for Hospital Discharge to Home: www.health.[facility].[place]/diseases/coronavirus/hcp/hospdischarge.pdf    [facility] clinical trials (COVID-19 research studies): clinicalaffairs.[facility]-clinical-trials     Below are the COVID-19 hotlines at the [facility] ([facility]). Interpreters are available.   o For health questions: Call 735-374-1894 or 1-742.836.7578 (7 a.m. to 7 p.m.)  o For questions about schools and childcare: Call 339-396-2101 or 1-652.300.3612 (7 a.m. to 7 p.m.)                     Reason for  Disposition    [1] COVID-19 infection suspected by caller or triager AND [2] mild symptoms (cough, fever, or others) AND [3] no complications or SOB    Additional Information    Negative: SEVERE difficulty breathing (e.g., struggling for each breath, speaks in single words)    Negative: Difficult to awaken or acting confused (e.g., disoriented, slurred speech)    Negative: Bluish (or gray) lips or face now    Negative: Shock suspected (e.g., cold/pale/clammy skin, too weak to stand, low BP, rapid pulse)    Negative: Sounds like a life-threatening emergency to the triager    Negative: [1] COVID-19 exposure AND [2] no symptoms    Negative: COVID-19 and Breastfeeding, questions about    Negative: [1] Adult with possible COVID-19 symptoms AND [2] triager concerned about severity of symptoms or other causes    Negative: SEVERE or constant chest pain or pressure (Exception: mild central chest pain, present only when coughing)    Negative: MODERATE difficulty breathing (e.g., speaks in phrases, SOB even at rest, pulse 100-120)    Negative: Patient sounds very sick or weak to the triager    Negative: MILD difficulty breathing (e.g., minimal/no SOB at rest, SOB with walking, pulse <100)    Negative: Chest pain or pressure    Negative: Fever > 103 F (39.4 C)    Negative: [1] Fever > 101 F (38.3 C) AND [2] age > 60    Negative: [1] Fever > 100.0 F (37.8 C) AND [2] bedridden (e.g., nursing home patient, CVA, chronic illness, recovering from surgery)    Negative: HIGH RISK patient (e.g., age > 64 years, diabetes, heart or lung disease, weak immune system) (Exception: Has already been evaluated by healthcare provider and has no new or worsening symptoms)    Negative: Fever present > 3 days (72 hours)    Negative: [1] Fever returns after gone for over 24 hours AND [2] symptoms worse or not improved    Negative: [1] Continuous (nonstop) coughing interferes with work or school AND [2] no improvement using cough treatment per  protocol    Protocols used: CORONAVIRUS (COVID-19) DIAGNOSED OR [id number] 8.4.20

## 2020-09-22 NOTE — TELEPHONE ENCOUNTER
Call from patient to schedule COVID 19 Testing as pt is symptomatic.    Curbside Testing scheduled:    Next 5 appointments (look out 90 days)    Sep 22, 2020 11:15 AM CDT  (Arrive by 11:00 AM)  SHORT with HC COLLECTION [facility] [place] ([facility] [place] ) [address]  [place] [address]  [phone]

## 2020-09-23 LAB
SARS-COV-2 RNA SPEC QL NAA+PROBE: NOT DETECTED
SPECIMEN SOURCE: NORMAL

## 2020-12-14 ENCOUNTER — HEALTH MAINTENANCE LETTER (OUTPATIENT)
Age: 40
End: 2020-12-14

## 2021-10-03 ENCOUNTER — HEALTH MAINTENANCE LETTER (OUTPATIENT)
Age: 41
End: 2021-10-03

## 2022-01-22 ENCOUNTER — HEALTH MAINTENANCE LETTER (OUTPATIENT)
Age: 42
End: 2022-01-22

## 2022-09-04 ENCOUNTER — HEALTH MAINTENANCE LETTER (OUTPATIENT)
Age: 42
End: 2022-09-04

## 2023-04-12 ENCOUNTER — OFFICE VISIT (OUTPATIENT)
Dept: FAMILY MEDICINE | Facility: OTHER | Age: 43
End: 2023-04-12
Attending: STUDENT IN AN ORGANIZED HEALTH CARE EDUCATION/TRAINING PROGRAM
Payer: COMMERCIAL

## 2023-04-12 VITALS
TEMPERATURE: 97.7 F | SYSTOLIC BLOOD PRESSURE: 128 MMHG | BODY MASS INDEX: 27.31 KG/M2 | DIASTOLIC BLOOD PRESSURE: 78 MMHG | HEART RATE: 96 BPM | WEIGHT: 207 LBS | OXYGEN SATURATION: 96 %

## 2023-04-12 DIAGNOSIS — R22.9 SUBCUTANEOUS NODULE: ICD-10-CM

## 2023-04-12 DIAGNOSIS — Z76.89 ENCOUNTER TO ESTABLISH CARE: Primary | ICD-10-CM

## 2023-04-12 DIAGNOSIS — Z91.030 H/O BEE STING ALLERGY: ICD-10-CM

## 2023-04-12 PROCEDURE — 99202 OFFICE O/P NEW SF 15 MIN: CPT | Performed by: STUDENT IN AN ORGANIZED HEALTH CARE EDUCATION/TRAINING PROGRAM

## 2023-04-12 RX ORDER — MULTIPLE VITAMINS W/ MINERALS TAB 9MG-400MCG
TAB ORAL DAILY
COMMUNITY

## 2023-04-12 RX ORDER — CHLORAL HYDRATE 500 MG
2 CAPSULE ORAL DAILY
COMMUNITY

## 2023-04-12 RX ORDER — MULTIVIT-MIN/IRON/FOLIC ACID/K 18-600-40
CAPSULE ORAL
COMMUNITY

## 2023-04-12 ASSESSMENT — ANXIETY QUESTIONNAIRES
IF YOU CHECKED OFF ANY PROBLEMS ON THIS QUESTIONNAIRE, HOW DIFFICULT HAVE THESE PROBLEMS MADE IT FOR YOU TO DO YOUR WORK, TAKE CARE OF THINGS AT HOME, OR GET ALONG WITH OTHER PEOPLE: NOT DIFFICULT AT ALL
GAD7 TOTAL SCORE: 0
3. WORRYING TOO MUCH ABOUT DIFFERENT THINGS: NOT AT ALL
2. NOT BEING ABLE TO STOP OR CONTROL WORRYING: NOT AT ALL
5. BEING SO RESTLESS THAT IT IS HARD TO SIT STILL: NOT AT ALL
8. IF YOU CHECKED OFF ANY PROBLEMS, HOW DIFFICULT HAVE THESE MADE IT FOR YOU TO DO YOUR WORK, TAKE CARE OF THINGS AT HOME, OR GET ALONG WITH OTHER PEOPLE?: NOT DIFFICULT AT ALL
7. FEELING AFRAID AS IF SOMETHING AWFUL MIGHT HAPPEN: NOT AT ALL
7. FEELING AFRAID AS IF SOMETHING AWFUL MIGHT HAPPEN: NOT AT ALL
4. TROUBLE RELAXING: NOT AT ALL
GAD7 TOTAL SCORE: 0
GAD7 TOTAL SCORE: 0
1. FEELING NERVOUS, ANXIOUS, OR ON EDGE: NOT AT ALL
6. BECOMING EASILY ANNOYED OR IRRITABLE: NOT AT ALL

## 2023-04-12 ASSESSMENT — PATIENT HEALTH QUESTIONNAIRE - PHQ9
10. IF YOU CHECKED OFF ANY PROBLEMS, HOW DIFFICULT HAVE THESE PROBLEMS MADE IT FOR YOU TO DO YOUR WORK, TAKE CARE OF THINGS AT HOME, OR GET ALONG WITH OTHER PEOPLE: NOT DIFFICULT AT ALL
SUM OF ALL RESPONSES TO PHQ QUESTIONS 1-9: 0
SUM OF ALL RESPONSES TO PHQ QUESTIONS 1-9: 0

## 2023-04-12 ASSESSMENT — PAIN SCALES - GENERAL: PAINLEVEL: NO PAIN (0)

## 2023-04-12 NOTE — PROGRESS NOTES
Assessment & Plan     Encounter to establish care  - No recent PCP  - No significant medical history or daily medications  - Return for annual physical/healthcare maintenance    Subcutaneous nodule  20+ year history of 1 cm subcutaneous nodule in the left inner thigh, asymptomatic benign appearance on exam, likely lipoma.  Discussed more definitive diagnosis with ultrasound but reassured at this point.  - Follow-up as needed    H/O bee sting allergy  No history of anaphylaxis/airway involvement, does have fairly severe localized reaction in the past.  - Declines refill of EpiPen    24 minutes spent by me on the date of the encounter doing chart review, history and exam, documentation and further activities per the note       Follow-up in the next month at patient convenience for physical.    Isaías Stuart MD  [facility] - [place] Segura is a 43 year old, presenting for the following health issues:  Establish Care    HPI       Establish care  -Has not had a PCP in 20+ years  -Suspects he recently passed a kidney stone but now is asymptomatic  -Works at [employer]    Subcutaneous nodule  -Slow-growing nodule on left inner thigh, probably been there at least 20 years  -Likely has been very slowly increasing in size  -Slightly tender to direct pressure but otherwise asymptomatic  -No swelling, redness, drainage    Review of Systems   Constitutional, HEENT, cardiovascular, pulmonary, gi and gu systems are negative, except as otherwise noted.      Objective    /78 (BP Location: Left arm, Patient Position: Sitting, Cuff Size: Adult Large)   Pulse 96   Temp 97.7  F (36.5  C) (Tympanic)   Wt 93.9 kg (207 lb)   SpO2 96%   BMI 27.31 kg/m    Body mass index is 27.31 kg/m .  Physical Exam  Vitals reviewed.   Constitutional:       Appearance: Normal appearance.   HENT:      Head: Normocephalic and atraumatic.   Cardiovascular:      Rate and Rhythm: Normal rate and regular rhythm.      Heart  sounds: No murmur heard.  Pulmonary:      Effort: Pulmonary effort is normal.      Breath sounds: Normal breath sounds. No stridor. No wheezing, rhonchi or rales.   Musculoskeletal:         General: Normal range of motion.   Skin:     General: Skin is warm and dry.      Comments: Approximate 1 cm subcutaneous nodule left inner proximal thigh without overlying skin changes, mildly sensitive to direct palpation.   Neurological:      General: No focal deficit present.      Mental Status: He is alert and oriented to person, place, and time.   Psychiatric:         Mood and Affect: Mood normal.         Behavior: Behavior normal.

## 2023-04-13 PROBLEM — R22.9 SUBCUTANEOUS NODULE: Status: ACTIVE | Noted: 2023-04-13

## 2023-04-13 PROBLEM — Z91.030 H/O BEE STING ALLERGY: Status: ACTIVE | Noted: 2023-04-13

## 2023-04-29 ENCOUNTER — HEALTH MAINTENANCE LETTER (OUTPATIENT)
Age: 43
End: 2023-04-29

## 2023-05-16 ASSESSMENT — ANXIETY QUESTIONNAIRES
GAD7 TOTAL SCORE: 0
4. TROUBLE RELAXING: NOT AT ALL
GAD7 TOTAL SCORE: 0
GAD7 TOTAL SCORE: 0
3. WORRYING TOO MUCH ABOUT DIFFERENT THINGS: NOT AT ALL
6. BECOMING EASILY ANNOYED OR IRRITABLE: NOT AT ALL
1. FEELING NERVOUS, ANXIOUS, OR ON EDGE: NOT AT ALL
7. FEELING AFRAID AS IF SOMETHING AWFUL MIGHT HAPPEN: NOT AT ALL
5. BEING SO RESTLESS THAT IT IS HARD TO SIT STILL: NOT AT ALL
7. FEELING AFRAID AS IF SOMETHING AWFUL MIGHT HAPPEN: NOT AT ALL
2. NOT BEING ABLE TO STOP OR CONTROL WORRYING: NOT AT ALL

## 2023-05-16 ASSESSMENT — ENCOUNTER SYMPTOMS
PARESTHESIAS: 0
JOINT SWELLING: 0
MYALGIAS: 0
ABDOMINAL PAIN: 0
NERVOUS/ANXIOUS: 0
CONSTIPATION: 0
HEARTBURN: 0
WEAKNESS: 0
HEMATOCHEZIA: 0
DIARRHEA: 0
PALPITATIONS: 0
SHORTNESS OF BREATH: 0
SORE THROAT: 0
FEVER: 0
FREQUENCY: 0
COUGH: 0
CHILLS: 0
DIZZINESS: 0
HEMATURIA: 0
ARTHRALGIAS: 0
EYE PAIN: 0
HEADACHES: 0
DYSURIA: 0
NAUSEA: 0

## 2023-05-16 ASSESSMENT — PATIENT HEALTH QUESTIONNAIRE - PHQ9
SUM OF ALL RESPONSES TO PHQ QUESTIONS 1-9: 0
SUM OF ALL RESPONSES TO PHQ QUESTIONS 1-9: 0
10. IF YOU CHECKED OFF ANY PROBLEMS, HOW DIFFICULT HAVE THESE PROBLEMS MADE IT FOR YOU TO DO YOUR WORK, TAKE CARE OF THINGS AT HOME, OR GET ALONG WITH OTHER PEOPLE: NOT DIFFICULT AT ALL

## 2023-05-17 ENCOUNTER — OFFICE VISIT (OUTPATIENT)
Dept: FAMILY MEDICINE | Facility: OTHER | Age: 43
End: 2023-05-17
Attending: STUDENT IN AN ORGANIZED HEALTH CARE EDUCATION/TRAINING PROGRAM
Payer: COMMERCIAL

## 2023-05-17 VITALS
SYSTOLIC BLOOD PRESSURE: 110 MMHG | OXYGEN SATURATION: 98 % | HEART RATE: 65 BPM | RESPIRATION RATE: 16 BRPM | DIASTOLIC BLOOD PRESSURE: 80 MMHG | WEIGHT: 197 LBS | TEMPERATURE: 96.1 F | BODY MASS INDEX: 25.99 KG/M2

## 2023-05-17 DIAGNOSIS — Z13.220 SCREENING FOR HYPERLIPIDEMIA: ICD-10-CM

## 2023-05-17 DIAGNOSIS — Z00.00 ROUTINE GENERAL MEDICAL EXAMINATION AT A HEALTH CARE FACILITY: Primary | ICD-10-CM

## 2023-05-17 DIAGNOSIS — Z00.00 HEALTHCARE MAINTENANCE: ICD-10-CM

## 2023-05-17 LAB
ANION GAP SERPL CALCULATED.3IONS-SCNC: 10 MMOL/L (ref 7–15)
BASOPHILS # BLD AUTO: 0 10E3/UL (ref 0–0.2)
BASOPHILS NFR BLD AUTO: 1 %
BUN SERPL-MCNC: 15.6 MG/DL (ref 6–20)
CALCIUM SERPL-MCNC: 9.4 MG/DL (ref 8.6–10)
CHLORIDE SERPL-SCNC: 102 MMOL/L (ref 98–107)
CHOLEST SERPL-MCNC: 262 MG/DL
CREAT SERPL-MCNC: 0.97 MG/DL (ref 0.67–1.17)
DEPRECATED HCO3 PLAS-SCNC: 26 MMOL/L (ref 22–29)
EOSINOPHIL # BLD AUTO: 0.1 10E3/UL (ref 0–0.7)
EOSINOPHIL NFR BLD AUTO: 2 %
ERYTHROCYTE [DISTWIDTH] IN BLOOD BY AUTOMATED COUNT: 12.5 % (ref 10–15)
GFR SERPL CREATININE-BSD FRML MDRD: >90 ML/MIN/1.73M2
GLUCOSE SERPL-MCNC: 91 MG/DL (ref 70–99)
HCT VFR BLD AUTO: 48.3 % (ref 40–53)
HDLC SERPL-MCNC: 57 MG/DL
HGB BLD-MCNC: 15.9 G/DL (ref 13.3–17.7)
IMM GRANULOCYTES # BLD: 0 10E3/UL
IMM GRANULOCYTES NFR BLD: 0 %
LDLC SERPL CALC-MCNC: 178 MG/DL
LYMPHOCYTES # BLD AUTO: 1.6 10E3/UL (ref 0.8–5.3)
LYMPHOCYTES NFR BLD AUTO: 29 %
MCH RBC QN AUTO: 29.2 PG (ref 26.5–33)
MCHC RBC AUTO-ENTMCNC: 32.9 G/DL (ref 31.5–36.5)
MCV RBC AUTO: 89 FL (ref 78–100)
MONOCYTES # BLD AUTO: 0.6 10E3/UL (ref 0–1.3)
MONOCYTES NFR BLD AUTO: 11 %
NEUTROPHILS # BLD AUTO: 3.2 10E3/UL (ref 1.6–8.3)
NEUTROPHILS NFR BLD AUTO: 57 %
NONHDLC SERPL-MCNC: 205 MG/DL
NRBC # BLD AUTO: 0 10E3/UL
NRBC BLD AUTO-RTO: 0 /100
PLATELET # BLD AUTO: 206 10E3/UL (ref 150–450)
POTASSIUM SERPL-SCNC: 4.3 MMOL/L (ref 3.4–5.3)
RBC # BLD AUTO: 5.44 10E6/UL (ref 4.4–5.9)
SODIUM SERPL-SCNC: 138 MMOL/L (ref 136–145)
TRIGL SERPL-MCNC: 134 MG/DL
WBC # BLD AUTO: 5.6 10E3/UL (ref 4–11)

## 2023-05-17 PROCEDURE — 80061 LIPID PANEL: CPT | Performed by: STUDENT IN AN ORGANIZED HEALTH CARE EDUCATION/TRAINING PROGRAM

## 2023-05-17 PROCEDURE — 80048 BASIC METABOLIC PNL TOTAL CA: CPT | Performed by: STUDENT IN AN ORGANIZED HEALTH CARE EDUCATION/TRAINING PROGRAM

## 2023-05-17 PROCEDURE — 99396 PREV VISIT EST AGE 40-64: CPT | Performed by: STUDENT IN AN ORGANIZED HEALTH CARE EDUCATION/TRAINING PROGRAM

## 2023-05-17 PROCEDURE — 36415 COLL VENOUS BLD VENIPUNCTURE: CPT | Performed by: STUDENT IN AN ORGANIZED HEALTH CARE EDUCATION/TRAINING PROGRAM

## 2023-05-17 PROCEDURE — 85025 COMPLETE CBC W/AUTO DIFF WBC: CPT | Performed by: STUDENT IN AN ORGANIZED HEALTH CARE EDUCATION/TRAINING PROGRAM

## 2023-05-17 ASSESSMENT — ENCOUNTER SYMPTOMS
DIZZINESS: 0
COUGH: 0
ABDOMINAL PAIN: 0
EYE PAIN: 0
SORE THROAT: 0
CHILLS: 0
FREQUENCY: 0
HEARTBURN: 0
NAUSEA: 0
HEMATURIA: 0
PALPITATIONS: 0
DIARRHEA: 0
PARESTHESIAS: 0
WEAKNESS: 0
HEMATOCHEZIA: 0
JOINT SWELLING: 0
ARTHRALGIAS: 0
MYALGIAS: 0
DYSURIA: 0
CONSTIPATION: 0
NERVOUS/ANXIOUS: 0
FEVER: 0
SHORTNESS OF BREATH: 0
HEADACHES: 0

## 2023-05-17 ASSESSMENT — PAIN SCALES - GENERAL: PAINLEVEL: NO PAIN (0)

## 2023-05-17 NOTE — PROGRESS NOTES
SUBJECTIVE:   CC: Colton is an 43 year old who presents for preventative health visit.       5/17/2023     8:11 AM   Additional Questions   Roomed by Sid Garduno LPN   Accompanied by self         5/17/2023     8:11 AM   Patient Reported Additional Medications   Patient reports taking the following new medications none   Patient has been advised of split billing requirements and indicates understanding: Yes  Healthy Habits:     Getting at least 3 servings of Calcium per day:  Yes    Bi-annual eye exam:  Yes    Dental care twice a year:  NO    Sleep apnea or symptoms of sleep apnea:  None    Diet:  Regular (no restrictions)    Frequency of exercise:  None    Taking medications regularly:  Yes    Medication side effects:  Not applicable    PHQ-2 Total Score: 0    Additional concerns today:  No    HPI:  -No concerns    Answers for HPI/ROS submitted by the patient on 5/16/2023  If you checked off any problems, how difficult have these problems made it for you to do your work, take care of things at home, or get along with other people?: Not difficult at all  PHQ9 TOTAL SCORE: 0  TATE 7 TOTAL SCORE: 0    Today's PHQ-2 Score:       5/16/2023     9:42 AM   PHQ-2 ( 1999 Pfizer)   Q1: Little interest or pleasure in doing things 0   Q2: Feeling down, depressed or hopeless 0   PHQ-2 Score 0   Q1: Little interest or pleasure in doing things Not at all   Q2: Feeling down, depressed or hopeless Not at all   PHQ-2 Score 0     Have you ever done Advance Care Planning? (For example, a Health Directive, POLST, or a discussion with a medical provider or your loved ones about your wishes): No, advance care planning information given to patient to review.  Patient declined advance care planning discussion at this time.    Social History     Tobacco Use     Smoking status: Former     Packs/day: 0.30     Years: 10.00     Pack years: 3.00     Types: Cigarettes     Smokeless tobacco: Not on file     Tobacco comments:     tried to quit  yes, yr quit 2009, no passive exposure   Vaping Use     Vaping status: Never Used   Substance Use Topics     Alcohol use: Yes     Comment: every other weekend           5/16/2023     9:42 AM   Alcohol Use   Prescreen: >3 drinks/day or >7 drinks/week? No       Last PSA: No results found for: PSA    Reviewed orders with patient. Reviewed health maintenance and updated orders accordingly - Yes  Lab work is in process    Reviewed and updated as needed this visit by clinical staff   Tobacco  Allergies  Meds            Reviewed and updated as needed this visit by Provider                 Review of Systems   Constitutional: Negative for chills and fever.   HENT: Negative for congestion, ear pain, hearing loss and sore throat.    Eyes: Negative for pain and visual disturbance.   Respiratory: Negative for cough and shortness of breath.    Cardiovascular: Negative for chest pain, palpitations and peripheral edema.   Gastrointestinal: Negative for abdominal pain, constipation, diarrhea, heartburn, hematochezia and nausea.   Genitourinary: Negative for dysuria, frequency, genital sores, hematuria, impotence, penile discharge and urgency.   Musculoskeletal: Negative for arthralgias, joint swelling and myalgias.   Skin: Negative for rash.   Neurological: Negative for dizziness, weakness, headaches and paresthesias.   Psychiatric/Behavioral: Negative for mood changes. The patient is not nervous/anxious.      OBJECTIVE:   /80 (BP Location: Right arm, Patient Position: Sitting, Cuff Size: Adult Large)   Pulse 65   Temp (!) 96.1  F (35.6  C) (Tympanic)   Resp 16   Wt 89.4 kg (197 lb)   SpO2 98%   BMI 25.99 kg/m      Physical Exam  GENERAL: healthy, alert and no distress  EYES: Eyes grossly normal to inspection, PERRL and conjunctivae and sclerae normal  HENT: ear canals and TM's normal, nose and mouth without ulcers or lesions  NECK: no adenopathy, no asymmetry, masses, or scars and thyroid normal to palpation  RESP:  "lungs clear to auscultation - no rales, rhonchi or wheezes  CV: regular rate and rhythm, normal S1 S2, no S3 or S4, no murmur, click or rub, no peripheral edema and peripheral pulses strong  ABDOMEN: soft, nontender, no hepatosplenomegaly, no masses and bowel sounds normal  MS: no gross musculoskeletal defects noted, no edema  SKIN: no suspicious lesions or rashes  NEURO: Normal strength and tone, mentation intact and speech normal  PSYCH: mentation appears normal, affect normal/bright    Labs pending as below.    ASSESSMENT/PLAN:     Routine general medical examination at a health care facility  - Basic metabolic panel  - CBC with platelets and differential  - Lipid Profile (Chol, Trig, HDL, LDL calc)  - Health screening guidelines provided in AVS  - Return in 1 year for annual physical    Healthcare maintenance  - BP: Normotensive, asymptomatic  - BMI: Discussed healthy diet and exercise recommendations  Estimated body mass index is 25.99 kg/m  as calculated from the following:    Height as of 9/7/18: 1.854 m (6' 1\").    Weight as of this encounter: 89.4 kg (197 lb).  - ASCVD risk screening: Lipid screening today, lifestyle risk mitigation   The ASCVD Risk score (Harrison DK, et al., 2019) failed to calculate for the following reasons:    Cannot find a previous HDL lab    Cannot find a previous total cholesterol lab  - Mood: Denies concerns      5/16/2023     9:42 AM 4/12/2023     1:48 PM   PHQ-2 ( 1999 Pfizer)   Q1: Little interest or pleasure in doing things 0 0   Q2: Feeling down, depressed or hopeless 0 0   PHQ-2 Score 0 0   Q1: Little interest or pleasure in doing things Not at all    Q2: Feeling down, depressed or hopeless Not at all    PHQ-2 Score 0      - Tobacco/substance screening: No tobacco or illicit substance use     Tobacco Use      Smoking status: Former        Packs/day: 0.30        Years: 10.00        Pack years: 3        Types: Cigarettes      Smokeless tobacco: None      Tobacco comments: tried " to quit yes, yr quit , no passive exposure    Vaping Use      Vaping status: Never Used  - Infectious disease screening: Low risk; deferred  - Cancer screening:              -Family history: N/A   -Lung: N/A   -Colon: Discussed adding at age 45 unless new symptoms   -Prostate: Discussed starting at age 50 unless new symptoms  - Immunizations: Up to date aside from seasonal vaccines    Screening for hyperlipidemia  Discussed ASCVD risk stratification, indications for statin/ASA, lifestyle management.  - Lipid Profile (Chol, Trig, HDL, LDL calc)    Patient has been advised of split billing requirements and indicates understanding: Yes      COUNSELING:   Reviewed preventive health counseling, as reflected in patient instructions       Regular exercise       Healthy diet/nutrition       Vision screening       Hearing screening       Consider Hep C screening for all patients one time for ages 18-79 years       HIV screeninx in teen years, 1x in adult years, and at intervals if high risk       Colorectal cancer screening       Prostate cancer screening      He reports that he has quit smoking. His smoking use included cigarettes. He has a 3.00 pack-year smoking history. He does not have any smokeless tobacco history on file.            Isaías Stuart MD  [facility] - [place]

## 2024-05-21 ENCOUNTER — OFFICE VISIT (OUTPATIENT)
Dept: FAMILY MEDICINE | Facility: OTHER | Age: 44
End: 2024-05-21
Attending: STUDENT IN AN ORGANIZED HEALTH CARE EDUCATION/TRAINING PROGRAM
Payer: COMMERCIAL

## 2024-05-21 VITALS
HEART RATE: 66 BPM | OXYGEN SATURATION: 98 % | DIASTOLIC BLOOD PRESSURE: 80 MMHG | BODY MASS INDEX: 27.23 KG/M2 | WEIGHT: 205.5 LBS | HEIGHT: 73 IN | SYSTOLIC BLOOD PRESSURE: 114 MMHG | RESPIRATION RATE: 16 BRPM | TEMPERATURE: 96.5 F

## 2024-05-21 DIAGNOSIS — Z00.00 HEALTHCARE MAINTENANCE: ICD-10-CM

## 2024-05-21 DIAGNOSIS — Z13.1 SCREENING FOR DIABETES MELLITUS: ICD-10-CM

## 2024-05-21 DIAGNOSIS — Z00.00 ROUTINE GENERAL MEDICAL EXAMINATION AT A HEALTH CARE FACILITY: Primary | ICD-10-CM

## 2024-05-21 DIAGNOSIS — Z13.220 SCREENING FOR HYPERLIPIDEMIA: ICD-10-CM

## 2024-05-21 DIAGNOSIS — Z11.9 SCREENING EXAMINATION FOR INFECTIOUS DISEASE: ICD-10-CM

## 2024-05-21 LAB
ALBUMIN SERPL BCG-MCNC: 4.2 G/DL (ref 3.5–5.2)
ALP SERPL-CCNC: 72 U/L (ref 40–150)
ALT SERPL W P-5'-P-CCNC: 36 U/L (ref 0–70)
ANION GAP SERPL CALCULATED.3IONS-SCNC: 6 MMOL/L (ref 7–15)
AST SERPL W P-5'-P-CCNC: 29 U/L (ref 0–45)
BASOPHILS # BLD AUTO: 0 10E3/UL (ref 0–0.2)
BASOPHILS NFR BLD AUTO: 1 %
BILIRUB SERPL-MCNC: 0.3 MG/DL
BUN SERPL-MCNC: 13.5 MG/DL (ref 6–20)
CALCIUM SERPL-MCNC: 9.5 MG/DL (ref 8.6–10)
CHLORIDE SERPL-SCNC: 106 MMOL/L (ref 98–107)
CHOLEST SERPL-MCNC: 243 MG/DL
CREAT SERPL-MCNC: 1.03 MG/DL (ref 0.67–1.17)
DEPRECATED HCO3 PLAS-SCNC: 28 MMOL/L (ref 22–29)
EGFRCR SERPLBLD CKD-EPI 2021: >90 ML/MIN/1.73M2
EOSINOPHIL # BLD AUTO: 0.1 10E3/UL (ref 0–0.7)
EOSINOPHIL NFR BLD AUTO: 2 %
ERYTHROCYTE [DISTWIDTH] IN BLOOD BY AUTOMATED COUNT: 12.5 % (ref 10–15)
FASTING STATUS PATIENT QL REPORTED: YES
FASTING STATUS PATIENT QL REPORTED: YES
GLUCOSE SERPL-MCNC: 95 MG/DL (ref 70–99)
HCT VFR BLD AUTO: 47.5 % (ref 40–53)
HDLC SERPL-MCNC: 55 MG/DL
HGB BLD-MCNC: 15.6 G/DL (ref 13.3–17.7)
IMM GRANULOCYTES # BLD: 0 10E3/UL
IMM GRANULOCYTES NFR BLD: 0 %
LDLC SERPL CALC-MCNC: 167 MG/DL
LYMPHOCYTES # BLD AUTO: 1.5 10E3/UL (ref 0.8–5.3)
LYMPHOCYTES NFR BLD AUTO: 28 %
MCH RBC QN AUTO: 29.2 PG (ref 26.5–33)
MCHC RBC AUTO-ENTMCNC: 32.8 G/DL (ref 31.5–36.5)
MCV RBC AUTO: 89 FL (ref 78–100)
MONOCYTES # BLD AUTO: 0.7 10E3/UL (ref 0–1.3)
MONOCYTES NFR BLD AUTO: 13 %
NEUTROPHILS # BLD AUTO: 3 10E3/UL (ref 1.6–8.3)
NEUTROPHILS NFR BLD AUTO: 55 %
NONHDLC SERPL-MCNC: 188 MG/DL
NRBC # BLD AUTO: 0 10E3/UL
NRBC BLD AUTO-RTO: 0 /100
PLATELET # BLD AUTO: 198 10E3/UL (ref 150–450)
POTASSIUM SERPL-SCNC: 4.3 MMOL/L (ref 3.4–5.3)
PROT SERPL-MCNC: 6.9 G/DL (ref 6.4–8.3)
RBC # BLD AUTO: 5.34 10E6/UL (ref 4.4–5.9)
SODIUM SERPL-SCNC: 140 MMOL/L (ref 135–145)
TRIGL SERPL-MCNC: 106 MG/DL
WBC # BLD AUTO: 5.4 10E3/UL (ref 4–11)

## 2024-05-21 PROCEDURE — 80061 LIPID PANEL: CPT | Performed by: STUDENT IN AN ORGANIZED HEALTH CARE EDUCATION/TRAINING PROGRAM

## 2024-05-21 PROCEDURE — 87389 HIV-1 AG W/HIV-1&-2 AB AG IA: CPT | Performed by: STUDENT IN AN ORGANIZED HEALTH CARE EDUCATION/TRAINING PROGRAM

## 2024-05-21 PROCEDURE — 86803 HEPATITIS C AB TEST: CPT | Performed by: STUDENT IN AN ORGANIZED HEALTH CARE EDUCATION/TRAINING PROGRAM

## 2024-05-21 PROCEDURE — 80053 COMPREHEN METABOLIC PANEL: CPT | Performed by: STUDENT IN AN ORGANIZED HEALTH CARE EDUCATION/TRAINING PROGRAM

## 2024-05-21 PROCEDURE — 36415 COLL VENOUS BLD VENIPUNCTURE: CPT | Performed by: STUDENT IN AN ORGANIZED HEALTH CARE EDUCATION/TRAINING PROGRAM

## 2024-05-21 PROCEDURE — 99396 PREV VISIT EST AGE 40-64: CPT | Performed by: STUDENT IN AN ORGANIZED HEALTH CARE EDUCATION/TRAINING PROGRAM

## 2024-05-21 PROCEDURE — 85025 COMPLETE CBC W/AUTO DIFF WBC: CPT | Performed by: STUDENT IN AN ORGANIZED HEALTH CARE EDUCATION/TRAINING PROGRAM

## 2024-05-21 SDOH — HEALTH STABILITY: PHYSICAL HEALTH: ON AVERAGE, HOW MANY DAYS PER WEEK DO YOU ENGAGE IN MODERATE TO STRENUOUS EXERCISE (LIKE A BRISK WALK)?: 7 DAYS

## 2024-05-21 ASSESSMENT — ANXIETY QUESTIONNAIRES
6. BECOMING EASILY ANNOYED OR IRRITABLE: NOT AT ALL
8. IF YOU CHECKED OFF ANY PROBLEMS, HOW DIFFICULT HAVE THESE MADE IT FOR YOU TO DO YOUR WORK, TAKE CARE OF THINGS AT HOME, OR GET ALONG WITH OTHER PEOPLE?: NOT DIFFICULT AT ALL
5. BEING SO RESTLESS THAT IT IS HARD TO SIT STILL: NOT AT ALL
7. FEELING AFRAID AS IF SOMETHING AWFUL MIGHT HAPPEN: NOT AT ALL
2. NOT BEING ABLE TO STOP OR CONTROL WORRYING: NOT AT ALL
GAD7 TOTAL SCORE: 0
IF YOU CHECKED OFF ANY PROBLEMS ON THIS QUESTIONNAIRE, HOW DIFFICULT HAVE THESE PROBLEMS MADE IT FOR YOU TO DO YOUR WORK, TAKE CARE OF THINGS AT HOME, OR GET ALONG WITH OTHER PEOPLE: NOT DIFFICULT AT ALL
GAD7 TOTAL SCORE: 0
1. FEELING NERVOUS, ANXIOUS, OR ON EDGE: NOT AT ALL
7. FEELING AFRAID AS IF SOMETHING AWFUL MIGHT HAPPEN: NOT AT ALL
GAD7 TOTAL SCORE: 0
4. TROUBLE RELAXING: NOT AT ALL
3. WORRYING TOO MUCH ABOUT DIFFERENT THINGS: NOT AT ALL

## 2024-05-21 ASSESSMENT — SOCIAL DETERMINANTS OF HEALTH (SDOH): HOW OFTEN DO YOU GET TOGETHER WITH FRIENDS OR RELATIVES?: ONCE A WEEK

## 2024-05-21 ASSESSMENT — PAIN SCALES - GENERAL: PAINLEVEL: NO PAIN (0)

## 2024-05-21 NOTE — PATIENT INSTRUCTIONS
"Preventive Care Advice   This is general advice we often give to help people stay healthy. Your care team may have specific advice just for you. Please talk to your care team about your own preventive care needs.  Lifestyle  Exercise at least 150 minutes each week (30 minutes a day, 5 days a week).  Do muscle strengthening activities 2 days a week. These help control your weight and prevent disease.  No smoking.  Wear sunscreen to prevent skin cancer.  Have your home tested for radon every 2 to 5 years. Radon is a colorless, odorless gas that can harm your lungs. To learn more, go to www.health.[facility].mn. and search for \"Radon in Homes.\"  Keep guns unloaded and locked up in a safe place like a safe or gun vault, or, use a gun lock and hide the keys. Always lock away bullets separately. To learn more, visit RND[place].mn.gov and search for \"safe gun storage.\"  Nutrition  Eat 5 or more servings of fruits and vegetables each day.  Try wheat bread, brown rice and whole grain pasta (instead of white bread, rice, and pasta).  Get enough calcium and vitamin D. Check the label on foods and aim for 100% of the RDA (recommended daily allowance).  Regular exams  Have a dental exam and cleaning every 6 months.  See your health care team every year to talk about:  Any changes in your health.  Any medicines your care team has prescribed.  Preventive care, family planning, and ways to prevent chronic diseases.  Shots (vaccines)   HPV shots (up to age 26), if you've never had them before.  Hepatitis B shots (up to age 59), if you've never had them before.  COVID-19 shot: Get this shot when it's due.  Flu shot: Get a flu shot every year.  Tetanus shot: Get a tetanus shot every 10 years.  Pneumococcal, hepatitis A, and RSV shots: Ask your care team if you need these based on your risk.  Shingles shot (for age 50 and up).  General health tests  Diabetes screening:  Starting at age 35, Get screened for diabetes at least every 3 years.  If " you are younger than age 35, ask your care team if you should be screened for diabetes.  Cholesterol test: At age 39, start having a cholesterol test every 5 years, or more often if advised.  Bone density scan (DEXA): At age 50, ask your care team if you should have this scan for osteoporosis (brittle bones).  Hepatitis C: Get tested at least once in your life.  Abdominal aortic aneurysm screening: Talk to your doctor about having this screening if you:  Have ever smoked; and  Are biologically male; and  Are between the ages of 65 and 75.  STIs (sexually transmitted infections)  Before age 24: Ask your care team if you should be screened for STIs.  After age 24: Get screened for STIs if you're at risk. You are at risk for STIs (including HIV) if:  You are sexually active with more than one person.  You don't use condoms every time.  You or a partner was diagnosed with a sexually transmitted infection.  If you are at risk for HIV, ask about PrEP medicine to prevent HIV.  Get tested for HIV at least once in your life, whether you are at risk for HIV or not.  Cancer screening tests  Cervical cancer screening: If you have a cervix, begin getting regular cervical cancer screening tests at age 21. Most people who have regular screenings with normal results can stop after age 65. Talk about this with your provider.  Breast cancer scan (mammogram): If you've ever had breasts, begin having regular mammograms starting at age 40. This is a scan to check for breast cancer.  Colon cancer screening: It is important to start screening for colon cancer at age 45.  Have a colonoscopy test every 10 years (or more often if you're at risk) Or, ask your provider about stool tests like a FIT test every year or Cologuard test every 3 years.  To learn more about your testing options, visit: www.boomtrain/753984.pdf.  For help making a decision, visit: felisha/qx60553.  Prostate cancer screening test: If you have a prostate and are age 55  to 69, ask your provider if you would benefit from a yearly prostate cancer screening test.  Lung cancer screening: If you are a current or former smoker age 50 to 80, ask your care team if ongoing lung cancer screenings are right for you.  For informational purposes only. Not to replace the advice of your health care provider. Copyright   2023 [employer]. All rights reserved. Clinically reviewed by the [facility] Transitions Program. [employer]ring [id number] - REV 04/24.

## 2024-05-21 NOTE — PROGRESS NOTES
"Preventive Care Visit  [facility]  Isaías Stuart MD, Family Medicine  May 21, 2024      Assessment & Plan     Routine general medical examination at a health care facility  - Hepatitis C Screen Reflex to HCV RNA Quant and Genotype  - HIV Antigen Antibody Combo  - Lipid Profile (Chol, Trig, HDL, LDL calc)  - Comprehensive metabolic panel (BMP + Alb, Alk Phos, ALT, AST, Total. Bili, TP)  - CBC with platelets and differential  - Preventative screening guidelines provided in AVS  - Return in 1 year for annual physical    Healthcare maintenance  - BP: Normotensive  - BMI: Reviewed.  Discussed healthy lifestyle management  Estimated body mass index is 27.11 kg/m  as calculated from the following:    Height as of this encounter: 1.854 m (6' 1\").    Weight as of this encounter: 93.2 kg (205 lb 8 oz).  - ASCVD risk screening: A1c/lipid screen today.  Discussed risk mitigation.   The 10-year ASCVD risk score (Eric MARIE, et al., 2019) is: 2%    Values used to calculate the score:      Age: 44 years      Sex: Male      Is Non- : No      Diabetic: No      Tobacco smoker: No      Systolic Blood Pressure: 114 mmHg      Is BP treated: No      HDL Cholesterol: 57 mg/dL      Total Cholesterol: 262 mg/dL  - Mood: No concerns.      5/16/2023     9:42 AM 4/12/2023     1:48 PM   PHQ-2 ( 1999 Pfizer)   Q1: Little interest or pleasure in doing things 0 0   Q2: Feeling down, depressed or hopeless 0 0   PHQ-2 Score 0 0   Q1: Little interest or pleasure in doing things Not at all    Q2: Feeling down, depressed or hopeless Not at all    PHQ-2 Score 0    - Tobacco/substance screening: No tobacco illicit substance use; quit smoking 2009     Tobacco Use      Smoking status: Former        Packs/day: 0.30        Years: 0.3 packs/day for 10.0 years (3.0 ttl pk-yrs)        Types: Cigarettes      Smokeless tobacco: None      Tobacco comments: tried to quit yes, yr quit 2009, no passive exposure  - Infectious disease " "screening: Low risk but will screen as below  - Cancer screening:              -Family history: No high risk family history   -Lung: n/a   -Colon: Discussed turning at age 45   -Prostate: Discussed turning at age 50  - Immunizations: Up-to-date    Screening for diabetes mellitus  - Comprehensive metabolic panel (BMP + Alb, Alk Phos, ALT, AST, Total. Bili, TP)    Screening for hyperlipidemia  - Lipid Profile (Chol, Trig, HDL, LDL calc)    Screening examination for infectious disease  - Hepatitis C Screen Reflex to HCV RNA Quant and Genotype  - HIV Antigen Antibody Combo    Patient has been advised of split billing requirements and indicates understanding: Yes      BMI  Estimated body mass index is 27.11 kg/m  as calculated from the following:    Height as of this encounter: 1.854 m (6' 1\").    Weight as of this encounter: 93.2 kg (205 lb 8 oz).   Weight management plan: Discussed healthy diet and exercise guidelines    Counseling  Appropriate preventive services were discussed with this patient, including applicable screening as appropriate for fall prevention, nutrition, physical activity, Tobacco-use cessation, weight loss and cognition.  Checklist reviewing preventive services available has been given to the patient.  Reviewed patient's diet, addressing concerns and/or questions.     Return in about 53 weeks (around 5/27/2025) for Annual Wellness Visit.    Rodolfo Segura is a 44 year old, presenting for the following:  Physical (Annual Visit)        5/21/2024     8:22 AM   Additional Questions   Roomed by Epi Mancilla LPN   Accompanied by Self         5/21/2024     8:22 AM   Patient Reported Additional Medications   Patient reports taking the following new medications None        Health Care Directive  Patient does not have a Health Care Directive or Living Will: Discussed advance care planning with patient; however, patient declined at this time.    HPI        5/21/2024   General Health   How would you " rate your overall physical health? Good   Feel stress (tense, anxious, or unable to sleep) Not at all         5/21/2024   Nutrition   Three or more servings of calcium each day? Yes   Diet: Regular (no restrictions)   How many servings of fruit and vegetables per day? (!) 0-1   How many sweetened beverages each day? 0-1         5/21/2024   Exercise   Days per week of moderate/strenous exercise 7 days         5/21/2024   Social Factors   Frequency of gathering with friends or relatives Once a week   Worry food won't last until get money to buy more No   Food not last or not have enough money for food? No   Do you have housing?  No   Are you worried about losing your housing? No   Lack of transportation? No   Unable to get utilities (heat,electricity)? No   Want help with housing or utility concern? No   (!) HOUSING CONCERN PRESENT      5/21/2024   Dental   Dentist two times every year? Yes         5/21/2024   TB Screening   Were you born outside of the [place]? No       Today's PHQ-9 Score:       5/21/2024     8:15 AM   PHQ-9 SCORE   PHQ-9 Total Score MyChart 0   PHQ-9 Total Score 0         5/21/2024   Substance Use   Alcohol more than 3/day or more than 7/wk No   Do you use any other substances recreationally? No     Social History     Tobacco Use    Smoking status: Former     Current packs/day: 0.30     Average packs/day: 0.3 packs/day for 10.0 years (3.0 ttl pk-yrs)     Types: Cigarettes    Tobacco comments:     tried to quit yes, yr quit 2009, no passive exposure   Vaping Use    Vaping status: Never Used   Substance Use Topics    Alcohol use: Yes     Comment: every other weekend             5/21/2024   One time HIV Screening   Previous HIV test? I don't know         5/21/2024   STI Screening   New sexual partner(s) since last STI/HIV test? No   ASCVD Risk   The 10-year ASCVD risk score (Eric MARIE, et al., 2019) is: 2%    Values used to calculate the score:      Age: 44 years      Sex: Male      Is  "Non- : No      Diabetic: No      Tobacco smoker: No      Systolic Blood Pressure: 114 mmHg      Is BP treated: No      HDL Cholesterol: 57 mg/dL      Total Cholesterol: 262 mg/dL        5/21/2024   Contraception/Family Planning   Questions about contraception or family planning No        Reviewed and updated as needed this visit by Provider                    Lab work is in process      Review of Systems  Constitutional, HEENT, cardiovascular, pulmonary, gi and gu systems are negative, except as otherwise noted.     Objective    Exam  /80   Pulse 66   Temp (!) 96.5  F (35.8  C) (Tympanic)   Resp 16   Ht 1.854 m (6' 1\")   Wt 93.2 kg (205 lb 8 oz)   SpO2 98%   BMI 27.11 kg/m     Estimated body mass index is 27.11 kg/m  as calculated from the following:    Height as of this encounter: 1.854 m (6' 1\").    Weight as of this encounter: 93.2 kg (205 lb 8 oz).    Physical Exam  GENERAL: alert and no distress  EYES: Eyes grossly normal to inspection, PERRL and conjunctivae and sclerae normal  HENT: ear canals and TM's normal, nose and mouth without ulcers or lesions  NECK: no adenopathy, no asymmetry, masses, or scars  RESP: lungs clear to auscultation - no rales, rhonchi or wheezes  CV: regular rate and rhythm, normal S1 S2, no S3 or S4, no murmur, click or rub, no peripheral edema  ABDOMEN: soft, nontender, no hepatosplenomegaly, no masses and bowel sounds normal  MS: no gross musculoskeletal defects noted, no edema  SKIN: no suspicious lesions or rashes  NEURO: Normal strength and tone, mentation intact and speech normal  PSYCH: mentation appears normal, affect normal/bright        Signed Electronically by: Isaías Stuart MD    "

## 2024-05-21 NOTE — COMMUNITY RESOURCES LIST (ENGLISH)
May 21, 2024           YOUR PERSONALIZED LIST OF SERVICES & PROGRAMS           & SHELTER    Housing      [facility]  [address] [place] [address] (Distance: 13.0 miles)  Phone: [phone]  Language: English  Fee: Free      [facility] - Shelter for individuals  [address] [place] [address] (Distance: 13.0 miles)  Phone: [phone]  Language: English  Fee: Free      Critical access hospital [facility] [employer]  Phone: [phone]  Website: https://www.SightCallIdeal Me/  Language: English, Hmong, Oromo, Tunisian, Turkmen  Hours: Mon 9:00 AM - 5:00 PM Tue 9:00 AM - 5:00 PM Wed 9:00 AM - 5:00 PM Thu 9:00 AM - 5:00 PM Fri 9:00 AM - 5:00 PM  Fee: Insurance  Accessibility: Blind accommodation, Deaf or hard of hearing, Translation services  Transportation Options: Free transportation    Case Management      [facility] search assistance  [address] [place] [address] (Distance: 12.8 miles)  Language: English  Fee: Free      [employer] - Housing Stabilization Services  Phone: [phone]  Website: https://homebasemn.com/  Language: English  Hours: Mon 8:00 AM - 4:00 PM Tue 8:00 AM - 4:00 PM Wed 8:00 AM - 4:00 PM Thu 8:00 AM - 4:00 PM Fri 8:00 AM - 4:00 PM  Fee: Free  Accessibility: Blind accommodation, Deaf or hard of hearing  Transportation Options: Free transportation    Drop-In Services      [employer]  Website: http://www.laundrylove.org               IMPORTANT NUMBERS & WEBSITES        Emergency Services  911  .   [employer]  211 http://211unitedway.org  .   Poison Control  (892) 194-7455 http://[place]poison.org http://wis[place]poison.org  .     Suicide and Crisis Lifeline  988 http://988lifeline.org  .   Childhelp National Child Abuse Hotline  293.969.8612 http://Childhelphotline.org   .   National Sexual Assault Hotline  (435) 353-8342 (HOPE) http://Rainn.org   .     National  Runaway Safeline  (416) 471-1837 (RUNAWAY) http://Nephosity.org  .   Pregnancy & Postpartum Support  Call/text 876-265-2879  [place]: http://ppsupportmn.org  [place]: http://psichapters.com/wi  .   Substance Abuse National Helpline ([facility])  730-523-HELP (9124) http://Findtreatment.gov   .                DISCLAIMER: These resources have been generated via the [employer] Platform. [employer] does not endorse any service providers mentioned in this resource list. [employer] does not guarantee that the services mentioned in this resource list will be available to you or will improve your health or wellness.    [facility]

## 2024-05-22 LAB
HCV AB SERPL QL IA: NONREACTIVE
HIV 1+2 AB+HIV1 P24 AG SERPL QL IA: NONREACTIVE

## 2025-05-13 ENCOUNTER — HOSPITAL ENCOUNTER (EMERGENCY)
Facility: HOSPITAL | Age: 45
Discharge: HOME | End: 2025-05-13
Attending: NURSE PRACTITIONER
Payer: COMMERCIAL

## 2025-05-13 VITALS
OXYGEN SATURATION: 96 % | SYSTOLIC BLOOD PRESSURE: 126 MMHG | RESPIRATION RATE: 18 BRPM | DIASTOLIC BLOOD PRESSURE: 78 MMHG | TEMPERATURE: 97.6 F | HEART RATE: 98 BPM

## 2025-05-13 DIAGNOSIS — S71.131A PUNCTURE WOUND OF RIGHT THIGH, INITIAL ENCOUNTER: Primary | ICD-10-CM

## 2025-05-13 PROCEDURE — 999N000104 HC STATISTIC NO CHARGE

## 2025-05-13 PROCEDURE — 12001 RPR S/N/AX/GEN/TRNK 2.5CM/<: CPT | Performed by: NURSE PRACTITIONER

## 2025-05-13 PROCEDURE — 12001 RPR S/N/AX/GEN/TRNK 2.5CM/<: CPT

## 2025-05-13 PROCEDURE — 90471 IMMUNIZATION ADMIN: CPT | Performed by: NURSE PRACTITIONER

## 2025-05-13 PROCEDURE — 250N000011 HC RX IP 250 OP 636: Performed by: NURSE PRACTITIONER

## 2025-05-13 PROCEDURE — 250N000009 HC RX 250: Performed by: NURSE PRACTITIONER

## 2025-05-13 PROCEDURE — 90715 TDAP VACCINE 7 YRS/> IM: CPT | Performed by: NURSE PRACTITIONER

## 2025-05-13 RX ORDER — DOXYCYCLINE 100 MG/1
100 CAPSULE ORAL 2 TIMES DAILY
Qty: 14 CAPSULE | Refills: 0 | Status: SHIPPED | OUTPATIENT
Start: 2025-05-13 | End: 2025-05-20

## 2025-05-13 RX ORDER — LIDOCAINE HYDROCHLORIDE 20 MG/ML
45 INJECTION, SOLUTION INFILTRATION; PERINEURAL ONCE
Status: COMPLETED | OUTPATIENT
Start: 2025-05-13 | End: 2025-05-13

## 2025-05-13 RX ADMIN — LIDOCAINE HYDROCHLORIDE 45 MG: 20 INJECTION, SOLUTION INFILTRATION; PERINEURAL at 16:43

## 2025-05-13 RX ADMIN — CLOSTRIDIUM TETANI TOXOID ANTIGEN (FORMALDEHYDE INACTIVATED), CORYNEBACTERIUM DIPHTHERIAE TOXOID ANTIGEN (FORMALDEHYDE INACTIVATED), BORDETELLA PERTUSSIS TOXOID ANTIGEN (GLUTARALDEHYDE INACTIVATED), BORDETELLA PERTUSSIS FILAMENTOUS HEMAGGLUTININ ANTIGEN (FORMALDEHYDE INACTIVATED), BORDETELLA PERTUSSIS PERTACTIN ANTIGEN, AND BORDETELLA PERTUSSIS FIMBRIAE 2/3 ANTIGEN 0.5 ML: 5; 2; 2.5; 5; 3; 5 INJECTION, SUSPENSION INTRAMUSCULAR at 16:42

## 2025-05-13 ASSESSMENT — COLUMBIA-SUICIDE SEVERITY RATING SCALE - C-SSRS
2. HAVE YOU ACTUALLY HAD ANY THOUGHTS OF KILLING YOURSELF IN THE PAST MONTH?: NO
6. HAVE YOU EVER DONE ANYTHING, STARTED TO DO ANYTHING, OR PREPARED TO DO ANYTHING TO END YOUR LIFE?: NO
1. IN THE PAST MONTH, HAVE YOU WISHED YOU WERE DEAD OR WISHED YOU COULD GO TO SLEEP AND NOT WAKE UP?: NO

## 2025-05-13 ASSESSMENT — ENCOUNTER SYMPTOMS
CHILLS: 0
PSYCHIATRIC NEGATIVE: 1
WOUND: 1
FEVER: 0
VOMITING: 0
SHORTNESS OF BREATH: 0
NAUSEA: 0
DIARRHEA: 0

## 2025-05-13 ASSESSMENT — ACTIVITIES OF DAILY LIVING (ADL): ADLS_ACUITY_SCORE: 41

## 2025-05-13 NOTE — ED PROVIDER NOTES
"  History     Chief Complaint   Patient presents with    Puncture Wound     HPI  Colton Booth is a 45 year old male who presents to urgent care today ambulatory with complaints of a puncture wound to right thigh from a hay bale spear prior to arrival.  Not actively bleeding.  Full ROM of right lower extremity.  Denies any fever, chills, nausea, vomiting, diarrhea, shortness of breath or chest pain.  Last Tdap completed on 5/13/2025, wants updated today.  No other concerns.    Allergies:  Allergies   Allergen Reactions    Bee Venom Swelling     Honey Bee hard time swallowing   Each time he is stung it gets \"worse every time\"       Problem List:    Patient Active Problem List    Diagnosis Date Noted    Subcutaneous nodule 04/13/2023     Priority: Medium    H/O bee sting allergy 04/13/2023     Priority: Medium        Past Medical History:    No past medical history on file.    Past Surgical History:    Past Surgical History:   Procedure Laterality Date    CIRCUMCISION      EYE SURGERY      lazy eye surgery, bilateral    FINGER SURGERY      cyst ring finger, LT       Family History:    Family History   Problem Relation Age of Onset    Diabetes Father     High cholesterol Father     Bladder Cancer Father     Lung Cancer Maternal Grandmother     Diabetes Paternal Grandfather        Social History:  Marital Status:   [2]  Social History     Tobacco Use    Smoking status: Former     Current packs/day: 0.30     Average packs/day: 0.3 packs/day for 10.0 years (3.0 ttl pk-yrs)     Types: Cigarettes    Tobacco comments:     tried to quit yes, yr quit 2009, no passive exposure   Vaping Use    Vaping status: Never Used   Substance Use Topics    Alcohol use: Yes     Comment: every other weekend        Medications:    Ascorbic Acid (VITAMIN C) 500 MG CAPS  doxycycline hyclate (VIBRAMYCIN) 100 MG capsule  fish oil-omega-3 fatty acids 1000 MG capsule  multivitamin w/minerals (MULTI-VITAMIN) tablet      Review of Systems "   Constitutional:  Negative for chills and fever.   Respiratory:  Negative for shortness of breath.    Cardiovascular:  Negative for chest pain.   Gastrointestinal:  Negative for diarrhea, nausea and vomiting.   Musculoskeletal:  Negative for gait problem.   Skin:  Positive for wound (right thigh).   Psychiatric/Behavioral: Negative.       Physical Exam   BP: 126/78  Pulse: 98  Temp: 97.6  F (36.4  C)  Resp: 18  SpO2: 96 %    Physical Exam  Vitals and nursing note reviewed.   Constitutional:       General: He is not in acute distress.     Appearance: Normal appearance. He is not ill-appearing or toxic-appearing.   Cardiovascular:      Rate and Rhythm: Normal rate and regular rhythm.      Pulses: Normal pulses.      Heart sounds: Normal heart sounds.   Pulmonary:      Effort: Pulmonary effort is normal.      Breath sounds: Normal breath sounds.   Skin:     General: Skin is warm and dry.      Capillary Refill: Capillary refill takes less than 2 seconds.      Findings: Wound (puncture wound right thigh, not actively bleeding, no signs of infection) present.   Neurological:      Mental Status: He is alert.   Psychiatric:         Mood and Affect: Mood normal.       ED Course     [facility]    -Laceration Repair    Date/Time: 5/13/2025 5:04 PM    Performed by: Charmaine Rodriguez NP  Authorized by: Charmaine Rodriguez NP    Risks, benefits and alternatives discussed.      ANESTHESIA (see MAR for exact dosages):     Anesthesia method:  Local infiltration    Local anesthetic:  Lidocaine 2% w/o epi  LACERATION DETAILS     Location:  Leg    Leg location:  R upper leg    Length (cm):  1    REPAIR TYPE:     Repair type:  Simple    EXPLORATION:     Contaminated: yes      TREATMENT:     Area cleansed with:  Hibiclens    Amount of cleaning:  Standard    Irrigation solution:  Sterile saline    Irrigation volume:  500    Irrigation method:  Pressure wash    Visualized foreign bodies/material removed: no      SKIN  REPAIR     Repair method:  Sutures    Suture size:  4-0    Suture material:  Nylon    Suture technique:  Simple interrupted    Number of sutures:  3    APPROXIMATION     Approximation:  Close    POST-PROCEDURE DETAILS     Dressing:  Antibiotic ointment and adhesive bandage      PROCEDURE    Patient Tolerance:  Patient tolerated the procedure well with no immediate complications    No results found for this or any previous visit (from the past 24 hours).    Medications   lidocaine injection 2% (45 mg Intradermal $Given 5/13/25 1647)   Tdap (tetanus-diphtheria-acell pertussis) (ADACEL) injection 0.5 mL (0.5 mLs Intramuscular $Given 5/13/25 1642)     Assessments & Plan (with Medical Decision Making)     I have reviewed the nursing notes.    I have reviewed the findings, diagnosis, plan and need for follow up with the patient.  (S71.030A) Puncture wound of right thigh, initial encounter  (primary encounter diagnosis)  Plan:   Patient ambulatory with a nontoxic appearance.  Patient arrived with a puncture wound to right thigh from a hay bale spear shortly before arrival, not actively bleeding, no signs of infection.  Full ROM of right lower extremity.  No fever, chills, nausea, vomiting, diarrhea, shortness of breath or chest pain.  Cleansed with Hibiclens, flushed with normal saline, lidocaine 2% without epi injected followed by 3 sutures, triple antibiotic and dressing, tolerated well.  Suture removal in 10-12 days.  Doxycycline as ordered.  Monitor for infection.  Ice area to help with pain and swelling.  Tdap updated today.  Follow-up with primary care provider or return to urgent care/ED with any worsening in condition or additional concerns.  Patient in agreement treatment plan.    New Prescriptions    DOXYCYCLINE HYCLATE (VIBRAMYCIN) 100 MG CAPSULE    Take 1 capsule (100 mg) by mouth 2 times daily for 7 days.     Final diagnoses:   Puncture wound of right thigh, initial encounter     5/13/2025   [facility]        Charmaine Rodriguez NP  05/13/25 1701

## 2025-05-13 NOTE — ED TRIAGE NOTES
C/o leg puncture    Happened today    Puncture to the right upper thigh     Brett vasquez     No otc meds

## 2025-05-13 NOTE — DISCHARGE INSTRUCTIONS
Doxycycline as ordered  - Take entire course of antibiotic even if you start to feel better.  - Antibiotics can cause stomach upset including nausea and diarrhea. Read your bottle or ask the pharmacist if antibiotic can be taken with food to help prevent nausea. If you have symptoms of diarrhea you can take an over-the-counter probiotic and/or increase foods with probiotics such as yogurt, Philipp, sauerkraut.    Monitor for infection    Ice area to help with swelling    1. Keep stitches absolutely dry for first 24 hours, then you may wash and shower as you normally would. Avoid soaking stitches as this can slow down healing and raise your chance of getting an infection.   2. Do not wash area for 24 hours after sutures were placed  3. After 24 hours you may remove the dressing and keep wound open to air  4. Bathing is permitted after 48 hours from suture placement  5. Use Tylenol or Ibuprofen for comfort.  6. Watch for signs of infection such as:    increased pain after 24 hours   Increased temperature   Redness or swelling   Yellow or greenish discharge   Foul odor  7.  May use over-the-counter Tylenol or ibuprofen PRN  8. Return to clinic if any of the above signs are present  9.  Return to clinic in 10 days for suture removal    Follow-up with primary care provider or return to urgent care/ED with any worsening in condition or additional concerns.

## 2025-08-28 SDOH — HEALTH STABILITY: PHYSICAL HEALTH: ON AVERAGE, HOW MANY MINUTES DO YOU ENGAGE IN EXERCISE AT THIS LEVEL?: PATIENT DECLINED

## 2025-08-28 SDOH — HEALTH STABILITY: PHYSICAL HEALTH: ON AVERAGE, HOW MANY DAYS PER WEEK DO YOU ENGAGE IN MODERATE TO STRENUOUS EXERCISE (LIKE A BRISK WALK)?: 7 DAYS

## 2025-09-02 ENCOUNTER — OFFICE VISIT (OUTPATIENT)
Dept: FAMILY MEDICINE | Facility: OTHER | Age: 45
End: 2025-09-02
Attending: STUDENT IN AN ORGANIZED HEALTH CARE EDUCATION/TRAINING PROGRAM
Payer: COMMERCIAL

## 2025-09-02 VITALS
WEIGHT: 206.9 LBS | HEIGHT: 74 IN | BODY MASS INDEX: 26.55 KG/M2 | DIASTOLIC BLOOD PRESSURE: 78 MMHG | SYSTOLIC BLOOD PRESSURE: 122 MMHG | HEART RATE: 98 BPM | OXYGEN SATURATION: 100 % | TEMPERATURE: 97.2 F | RESPIRATION RATE: 18 BRPM

## 2025-09-02 DIAGNOSIS — Z00.00 HEALTHCARE MAINTENANCE: ICD-10-CM

## 2025-09-02 DIAGNOSIS — Z00.00 ROUTINE GENERAL MEDICAL EXAMINATION AT A HEALTH CARE FACILITY: Primary | ICD-10-CM

## 2025-09-02 DIAGNOSIS — E78.5 HYPERLIPIDEMIA LDL GOAL <100: ICD-10-CM

## 2025-09-02 DIAGNOSIS — Z13.1 SCREENING FOR DIABETES MELLITUS: ICD-10-CM

## 2025-09-02 DIAGNOSIS — Z12.11 SCREENING FOR MALIGNANT NEOPLASM OF COLON: ICD-10-CM

## 2025-09-02 LAB
ALBUMIN SERPL BCG-MCNC: 4.1 G/DL (ref 3.5–5.2)
ALP SERPL-CCNC: 84 U/L (ref 40–150)
ALT SERPL W P-5'-P-CCNC: 27 U/L (ref 0–70)
ANION GAP SERPL CALCULATED.3IONS-SCNC: 11 MMOL/L (ref 7–15)
AST SERPL W P-5'-P-CCNC: 26 U/L (ref 0–45)
BASOPHILS # BLD AUTO: <0.03 10E3/UL (ref 0–0.2)
BASOPHILS NFR BLD AUTO: 0.3 %
BILIRUB SERPL-MCNC: 0.4 MG/DL
BUN SERPL-MCNC: 17.2 MG/DL (ref 6–20)
CALCIUM SERPL-MCNC: 8.8 MG/DL (ref 8.8–10.4)
CHLORIDE SERPL-SCNC: 104 MMOL/L (ref 98–107)
CHOLEST SERPL-MCNC: 235 MG/DL
CREAT SERPL-MCNC: 1.11 MG/DL (ref 0.67–1.17)
EGFRCR SERPLBLD CKD-EPI 2021: 83 ML/MIN/1.73M2
EOSINOPHIL # BLD AUTO: 0.11 10E3/UL (ref 0–0.7)
EOSINOPHIL NFR BLD AUTO: 1.7 %
ERYTHROCYTE [DISTWIDTH] IN BLOOD BY AUTOMATED COUNT: 12.2 % (ref 10–15)
FASTING STATUS PATIENT QL REPORTED: NO
FASTING STATUS PATIENT QL REPORTED: NO
GLUCOSE SERPL-MCNC: 107 MG/DL (ref 70–99)
HCO3 SERPL-SCNC: 23 MMOL/L (ref 22–29)
HCT VFR BLD AUTO: 44.2 % (ref 40–53)
HDLC SERPL-MCNC: 48 MG/DL
HGB BLD-MCNC: 14.9 G/DL (ref 13.3–17.7)
IMM GRANULOCYTES # BLD: 0.03 10E3/UL
IMM GRANULOCYTES NFR BLD: 0.5 %
LDLC SERPL CALC-MCNC: 146 MG/DL
LYMPHOCYTES # BLD AUTO: 1.53 10E3/UL (ref 0.8–5.3)
LYMPHOCYTES NFR BLD AUTO: 23.3 %
MCH RBC QN AUTO: 29.7 PG (ref 26.5–33)
MCHC RBC AUTO-ENTMCNC: 33.7 G/DL (ref 31.5–36.5)
MCV RBC AUTO: 88.2 FL (ref 78–100)
MONOCYTES # BLD AUTO: 0.71 10E3/UL (ref 0–1.3)
MONOCYTES NFR BLD AUTO: 10.8 %
NEUTROPHILS # BLD AUTO: 4.16 10E3/UL (ref 1.6–8.3)
NEUTROPHILS NFR BLD AUTO: 63.4 %
NONHDLC SERPL-MCNC: 187 MG/DL
NRBC # BLD AUTO: <0.03 10E3/UL
NRBC BLD AUTO-RTO: 0 /100
PLATELET # BLD AUTO: 207 10E3/UL (ref 150–450)
POTASSIUM SERPL-SCNC: 3.8 MMOL/L (ref 3.4–5.3)
PROT SERPL-MCNC: 6.7 G/DL (ref 6.4–8.3)
RBC # BLD AUTO: 5.01 10E6/UL (ref 4.4–5.9)
SODIUM SERPL-SCNC: 138 MMOL/L (ref 135–145)
TRIGL SERPL-MCNC: 204 MG/DL
TSH SERPL DL<=0.005 MIU/L-ACNC: 1.6 UIU/ML (ref 0.3–4.2)
WBC # BLD AUTO: 6.56 10E3/UL (ref 4–11)

## 2025-09-02 ASSESSMENT — PAIN SCALES - GENERAL: PAINLEVEL_OUTOF10: NO PAIN (0)
